# Patient Record
Sex: FEMALE | Race: WHITE | Employment: UNEMPLOYED | URBAN - METROPOLITAN AREA
[De-identification: names, ages, dates, MRNs, and addresses within clinical notes are randomized per-mention and may not be internally consistent; named-entity substitution may affect disease eponyms.]

---
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
## 2021-01-03 ENCOUNTER — APPOINTMENT (OUTPATIENT)
Dept: CT IMAGING | Age: 21
End: 2021-01-03
Payer: COMMERCIAL

## 2021-01-03 ENCOUNTER — HOSPITAL ENCOUNTER (EMERGENCY)
Age: 21
Discharge: HOME | End: 2021-01-03
Attending: STUDENT IN AN ORGANIZED HEALTH CARE EDUCATION/TRAINING PROGRAM
Payer: COMMERCIAL

## 2021-01-03 VITALS
RESPIRATION RATE: 14 BRPM | HEIGHT: 62 IN | SYSTOLIC BLOOD PRESSURE: 102 MMHG | DIASTOLIC BLOOD PRESSURE: 72 MMHG | TEMPERATURE: 98.4 F | BODY MASS INDEX: 20.89 KG/M2 | HEART RATE: 88 BPM | OXYGEN SATURATION: 100 %

## 2021-01-03 DIAGNOSIS — N83.201 RIGHT OVARIAN CYST: Primary | ICD-10-CM

## 2021-01-03 DIAGNOSIS — N93.8 DYSFUNCTIONAL UTERINE BLEEDING: ICD-10-CM

## 2021-01-03 LAB
ALBUMIN SERPL-MCNC: 4.5 G/DL (ref 3.4–5)
ALP BLD-CCNC: 71 U/L (ref 40–129)
ALT SERPL-CCNC: 12 U/L (ref 10–40)
ANION GAP SERPL CALCULATED.3IONS-SCNC: 10 MMOL/L (ref 3–16)
AST SERPL-CCNC: 19 U/L (ref 15–37)
BACTERIA WET PREP: NORMAL
BACTERIA: ABNORMAL /HPF
BASOPHILS ABSOLUTE: 0 K/UL (ref 0–0.2)
BASOPHILS RELATIVE PERCENT: 0.6 %
BILIRUB SERPL-MCNC: <0.2 MG/DL (ref 0–1)
BILIRUBIN DIRECT: <0.2 MG/DL (ref 0–0.3)
BILIRUBIN URINE: NEGATIVE
BILIRUBIN, INDIRECT: NORMAL MG/DL (ref 0–1)
BLOOD, URINE: ABNORMAL
BUN BLDV-MCNC: 12 MG/DL (ref 7–20)
CALCIUM SERPL-MCNC: 9.2 MG/DL (ref 8.3–10.6)
CHLORIDE BLD-SCNC: 104 MMOL/L (ref 99–110)
CLARITY: ABNORMAL
CLUE CELLS: NORMAL
CO2: 26 MMOL/L (ref 21–32)
COLOR: YELLOW
CREAT SERPL-MCNC: 0.6 MG/DL (ref 0.6–1.1)
EOSINOPHILS ABSOLUTE: 0.1 K/UL (ref 0–0.6)
EOSINOPHILS RELATIVE PERCENT: 1.6 %
EPITHELIAL CELLS WET PREP: NORMAL
EPITHELIAL CELLS, UA: ABNORMAL /HPF (ref 0–5)
GFR AFRICAN AMERICAN: >60
GFR NON-AFRICAN AMERICAN: >60
GLUCOSE BLD-MCNC: 86 MG/DL (ref 70–99)
GLUCOSE URINE: NEGATIVE MG/DL
HCG QUALITATIVE: NEGATIVE
HCT VFR BLD CALC: 35.9 % (ref 36–48)
HEMOGLOBIN: 11 G/DL (ref 12–16)
KETONES, URINE: ABNORMAL MG/DL
LEUKOCYTE ESTERASE, URINE: ABNORMAL
LIPASE: 34 U/L (ref 13–60)
LYMPHOCYTES ABSOLUTE: 2.4 K/UL (ref 1–5.1)
LYMPHOCYTES RELATIVE PERCENT: 35.9 %
MCH RBC QN AUTO: 24.2 PG (ref 26–34)
MCHC RBC AUTO-ENTMCNC: 30.7 G/DL (ref 31–36)
MCV RBC AUTO: 78.7 FL (ref 80–100)
MICROSCOPIC EXAMINATION: YES
MONOCYTES ABSOLUTE: 0.7 K/UL (ref 0–1.3)
MONOCYTES RELATIVE PERCENT: 10.5 %
MUCUS: ABNORMAL /LPF
NEUTROPHILS ABSOLUTE: 3.4 K/UL (ref 1.7–7.7)
NEUTROPHILS RELATIVE PERCENT: 51.4 %
NITRITE, URINE: NEGATIVE
PDW BLD-RTO: 15.9 % (ref 12.4–15.4)
PH UA: 7 (ref 5–8)
PLATELET # BLD: 308 K/UL (ref 135–450)
PMV BLD AUTO: 7.2 FL (ref 5–10.5)
POTASSIUM REFLEX MAGNESIUM: 4.1 MMOL/L (ref 3.5–5.1)
PROTEIN UA: NEGATIVE MG/DL
RBC # BLD: 4.56 M/UL (ref 4–5.2)
RBC UA: ABNORMAL /HPF (ref 0–4)
RBC WET PREP: NORMAL
SODIUM BLD-SCNC: 140 MMOL/L (ref 136–145)
SOURCE WET PREP: NORMAL
SPECIFIC GRAVITY UA: 1.02 (ref 1–1.03)
TOTAL PROTEIN: 7.4 G/DL (ref 6.4–8.2)
TRICHOMONAS PREP: NORMAL
URINE REFLEX TO CULTURE: YES
URINE TYPE: ABNORMAL
UROBILINOGEN, URINE: 0.2 E.U./DL
WBC # BLD: 6.6 K/UL (ref 4–11)
WBC UA: ABNORMAL /HPF (ref 0–5)
WBC WET PREP: NORMAL
YEAST WET PREP: NORMAL

## 2021-01-03 PROCEDURE — 85025 COMPLETE CBC W/AUTO DIFF WBC: CPT

## 2021-01-03 PROCEDURE — 87086 URINE CULTURE/COLONY COUNT: CPT

## 2021-01-03 PROCEDURE — 99284 EMERGENCY DEPT VISIT MOD MDM: CPT

## 2021-01-03 PROCEDURE — 80048 BASIC METABOLIC PNL TOTAL CA: CPT

## 2021-01-03 PROCEDURE — 81001 URINALYSIS AUTO W/SCOPE: CPT

## 2021-01-03 PROCEDURE — 87591 N.GONORRHOEAE DNA AMP PROB: CPT

## 2021-01-03 PROCEDURE — 87491 CHLMYD TRACH DNA AMP PROBE: CPT

## 2021-01-03 PROCEDURE — 36415 COLL VENOUS BLD VENIPUNCTURE: CPT

## 2021-01-03 PROCEDURE — 74177 CT ABD & PELVIS W/CONTRAST: CPT

## 2021-01-03 PROCEDURE — 6360000004 HC RX CONTRAST MEDICATION: Performed by: STUDENT IN AN ORGANIZED HEALTH CARE EDUCATION/TRAINING PROGRAM

## 2021-01-03 PROCEDURE — 80076 HEPATIC FUNCTION PANEL: CPT

## 2021-01-03 PROCEDURE — 83690 ASSAY OF LIPASE: CPT

## 2021-01-03 PROCEDURE — 84703 CHORIONIC GONADOTROPIN ASSAY: CPT

## 2021-01-03 PROCEDURE — 87210 SMEAR WET MOUNT SALINE/INK: CPT

## 2021-01-03 RX ORDER — IBUPROFEN 600 MG/1
600 TABLET ORAL 4 TIMES DAILY PRN
Qty: 40 TABLET | Refills: 0 | Status: SHIPPED | OUTPATIENT
Start: 2021-01-03

## 2021-01-03 RX ADMIN — IOPAMIDOL 100 ML: 755 INJECTION, SOLUTION INTRAVENOUS at 16:40

## 2021-01-03 ASSESSMENT — PAIN DESCRIPTION - LOCATION: LOCATION: ABDOMEN

## 2021-01-03 ASSESSMENT — PAIN SCALES - GENERAL: PAINLEVEL_OUTOF10: 5

## 2021-01-03 ASSESSMENT — PAIN DESCRIPTION - FREQUENCY: FREQUENCY: INTERMITTENT

## 2021-01-03 ASSESSMENT — PAIN - FUNCTIONAL ASSESSMENT: PAIN_FUNCTIONAL_ASSESSMENT: ACTIVITIES ARE NOT PREVENTED

## 2021-01-03 ASSESSMENT — PAIN DESCRIPTION - ONSET: ONSET: ON-GOING

## 2021-01-03 ASSESSMENT — PAIN DESCRIPTION - PAIN TYPE: TYPE: ACUTE PAIN

## 2021-01-03 NOTE — ED NOTES
Patient verbalized understanding of discharge instructions and follow-up care. Patient was given 1 prescription and verbalized understanding of instructions for said prescription. Breathing regular, no distress, skin color, texture, turgor normal. No rashes or lesions, patient alert and oriented X3. Patient ambulated out of emergency department with steady gait to private vehicle.        Rona Conley RN  01/03/21 7981

## 2021-01-03 NOTE — ED PROVIDER NOTES
[address] ENCOUNTER      Pt Name: Justice Mock  MRN: [id number]  [place] 2000  Date of evaluation: 1/3/2021  Provider: Alisha Syed MD    [address] [place] [address]       Chief Complaint   Patient presents with    Abdominal Pain     x 3 weeks, no worse, no better, cramping and aching    Vaginal Bleeding     x 3 weeks, 4 months post partum         HISTORY OF PRESENT ILLNESS   (Location/Symptom, Timing/Onset,Context/Setting, Quality, Duration, Modifying Factors, Severity)  Note limiting factors. Justice Mock is a 24 y.o. female who presents to the emergency department who c/o cramping right sided abdominal pain for the past 3 weeks. Onset gradual, persistent, not worsening, associated with intermittent vaginal bleeding she describes as initially like her typical period 3 weeks ago and then intermittent spotting since. She is 4 months post-partum from an uncomplicated  where she had low grade vaginal lacerations repaired. She denies vaginal pain, states scant white vaginal discharge that she states is always present and unchanged. Pain predominantly located on the right side, upper quadrant more than lower quadrant, described as mild to moderate cramping pain but typically persistent throughout the day, not severe. Denies fevers, chills, nausea, vomiting, anorexia, diarrhea, constipation, dyspareunia, dysuria, frequency, hematuria. She states she broke up with her male sexual partner a few weeks ago. They did not use any barrier contraception. Symptoms not otherwise alleviated or exacerbated by other factors. NursingNotes were reviewed. REVIEW OF SYSTEMS    (2-9 systems for level 4, 10 or more for level 5)       Constitutional: No fever or chills. Eye: No visual disturbances. No eye pain. Ear/Nose/Mouth/Throat: No nasal congestion. No sore throat. Respiratory: No cough, No shortness of breath, No sputum production.   Cardiovascular: No chest pain. No palpitations. Gastrointestinal: + abdominal pain. No nausea or vomiting  Genitourinary: No dysuria. No hematuria. Hematology/Lymphatics: No bleeding or bruising tendency. Immunologic: No malaise. No swollen glands. Musculoskeletal: No back pain. No joint pain. Integumentary: No rash. No abrasions. Neurologic: No headache. No focal numbness or weakness. PAST MEDICAL HISTORY   History reviewed. No pertinent past medical history. SURGICALHISTORY     History reviewed. No pertinent surgical history. CURRENT MEDICATIONS       Previous Medications    ACETAMINOPHEN (TYLENOL) 325 MG TABLET    Take 650 mg by mouth every 6 hours as needed for Pain    IBUPROFEN (IBU) 600 MG TABLET    Take 1 tablet by mouth every 6 hours as needed for Pain    PRENATAL VIT-FE FUMARATE-FA (PRENATAL 19 PO)    Take by mouth       ALLERGIES     Patient has no known allergies. FAMILY HISTORY     History reviewed. No pertinent family history.        SOCIAL HISTORY       Social History     Socioeconomic History    Marital status: Single     Spouse name: None    Number of children: None    Years of education: None    Highest education level: None   Occupational History    None   Social Needs    Financial resource strain: None    Food insecurity     Worry: None     Inability: None    Transportation needs     Medical: None     Non-medical: None   Tobacco Use    Smoking status: Never Smoker    Smokeless tobacco: Never Used   Substance and Sexual Activity    Alcohol use: No    Drug use: No    Sexual activity: Yes     Partners: Male   Lifestyle    Physical activity     Days per week: None     Minutes per session: None    Stress: None   Relationships    Social connections     Talks on phone: None     Gets together: None     Attends Jain service: None     Active member of club or organization: None     Attends meetings of clubs or organizations: None     Relationship status: None    Intimate partner violence     Fear of current or ex partner: None     Emotionally abused: None     Physically abused: None     Forced sexual activity: None   Other Topics Concern    None   Social History Narrative    None       SCREENINGS             PHYSICAL EXAM    (up to 7 for level 4, 8 or more for level 5)     ED Triage Vitals [01/03/21 1548]   BP Temp Temp Source Heart Rate Resp SpO2 Height Weight   120/67 98.4 °F (36.9 °C) Oral 96 14 100 % -- --       General: Alert and oriented appropriately for age, No acute distress. Eye: Normal conjunctiva. Pupils equal and reactive. HENT: Oral mucosa is moist.  Respiratory: Respirations even and non-labored. Cardiovascular: Normal rate, Regular rhythm. Gastrointestinal: Soft, Ttp in the RUQ and RLQ, Non-distended. :  RN ravi Estrella present during exam.  Normal external female genitalia, no trauma or lacerations. Scant blood in the vaginal vault, small amount of what appears to be white physiologic discharge mixed with the blood. Os closed. No CMT, no left adnexal tenderness, small amount R sided adnexal ttp. No masses, no fullness. Uterus nttp. Musculoskeletal: No swelling. Integumentary: Warm, Dry. Neurologic: Alert and appropriate for age. No focal deficits. Psychiatric: Cooperative. DIAGNOSTIC RESULTS       RADIOLOGY:   Non-plain filmimages such as CT, Ultrasound and MRI are read by the radiologist. Plain radiographic images are visualized and preliminarily interpreted by the emergency physician with the below findings:      Interpretation per the Radiologist below, if available at the time ofthis note:    CT ABDOMEN PELVIS W IV CONTRAST Additional Contrast? None   Final Result   2.6 cm right ovarian cyst.  Otherwise negative examination, no other findings   to explain abdomen pain.                LABS:  Labs Reviewed   CBC WITH AUTO DIFFERENTIAL - Abnormal; Notable for the following components:       Result Value    Hemoglobin 11.0 (*)     Hematocrit 35.9 (*)     MCV 78.7 (*)     MCH 24.2 (*)     MCHC 30.7 (*)     RDW 15.9 (*)     All other components within normal limits    Narrative:     Performed at:  [facility]  [address]   Phone [phone]   URINE RT REFLEX TO CULTURE - Abnormal; Notable for the following components:    Ketones, Urine TRACE (*)     Blood, Urine LARGE (*)     Leukocyte Esterase, Urine SMALL (*)     All other components within normal limits    Narrative:     Performed at:  [facility]  [address]   Phone [phone]   MICROSCOPIC URINALYSIS - Abnormal; Notable for the following components:    Mucus, UA Rare (*)     WBC, UA 10-20 (*)     RBC, UA 11-20 (*)     Epithelial Cells, UA 11-20 (*)     Bacteria, UA 2+ (*)     All other components within normal limits    Narrative:     Performed at:  [facility]  [address]   Phone [phone] DNA, URINE   WET PREP, GENITAL   CULTURE, URINE   BASIC METABOLIC PANEL W/ REFLEX TO MG FOR LOW K    Narrative:     Performed at:  [facility]  [address]   Phone [phone]   HEPATIC FUNCTION PANEL    Narrative:     Performed at:  [facility]  [address]   Phone [phone]   LIPASE    Narrative:     Performed at:  [facility]  [address]   Phone [phone]   HCG, SERUM, QUALITATIVE    Narrative:     Performed at:  [facility]  [address]   Phone [phone]       All other labs were within normal range or not returned as of this dictation.     EMERGENCY DEPARTMENT COURSE and DIFFERENTIAL DIAGNOSIS/MDM:   Vitals:    Vitals:    21 1548   BP: 120/67   Pulse: 96   Resp: 14   Temp: 98.4 °F (36.9 °C)   TempSrc: Oral   SpO2: 100%         Medical decision makin yo F with R-sided cramping abdominal pain and tenderness coupled with vaginal bleeding for the past 3 weeks. Pt also with vaginal discharge but does appear to be physiologic and unchanged from prior, mild amt R adnexal ttp on examination as well. Pain/tenderness predominantly RUQ on exam, pt young for gallstones but given recent pregnancy will examine with CT to r/o in addition to ruling out appendicitis though less likely given chronicity of illness, no leukocytosis. History of persistent mild-moderate cramping pain x 2-3 weeks coupled with bleeding not c/w torsion, getting hCG to r/o ectopic. Found to have R ovarian cyst on exam, likely etiology of pt's pain and possibly contributing to DUB, will likely need further w/u as outpatient. Appears to be simple cyst on CT, does not appear to be TOA. Pt again afebrile, HDS, without vomiting and with minimal adnexal ttp. Pt wishes to be tested for gonorrhea/chlamydia and offered empiric treatment but prefers to wait for results prior to treatment. Had shared decision making discussion with Ms. Keila Osorio regarding obtainment of ultrasound in the emergency department at [facility] to definitively r/o TOA, discussing my clinical impression that her pain is most likely from the ovarian cyst but cannot completely rule out TOA without further testing and workup despite the lower likelihood of that diagnosis. She voiced understanding of this, the present albeit low chance of potential implications of missed diagnosis to her health and fertility and prefers to follow up as an outpatient with her gynecologist for further workup and outpatient ultrasound to better characterize the R ovarian cyst.  She tolerates PO in the ED, remains HDS, Hb 11. Has no urinary sxs to suggest UTI. Given d/c instructions and strict return precautions educating her on signs and symptoms that should prompt return to the ED in lay terminology, she voices understanding. Rx ibuprofen for pain. D/c home, ambulated steadily from the ED. FINAL IMPRESSION      1. Right ovarian cyst    2. Dysfunctional uterine bleeding          DISPOSITION/PLAN   DISPOSITION  Discharge home with gyn follow up. PATIENT REFERRED TO:  Lilian Goldberg MD  [address] [place] [address] Drive  [address] [place] [address]  [phone]    In 2 days      [address] [place] [address] [phone]    If symptoms worsen    [facility] [address]  [address]  [phone]  In 2 days  as you are established with them. You can also follow up with Dr. Sandee Orellana if you are unable to be seen by [facility].       DISCHARGE MEDICATIONS:  Discharge Medication List as of 1/3/2021  5:45 PM      START taking these medications    Details   ibuprofen (ADVIL;MOTRIN) 600 MG tablet Take 1 tablet by mouth 4 times daily as needed for Pain, Disp-40 tablet, R-0Print                (Please note that portions of this note were completed with a voice recognition program.Efforts were made to edit the dictations but occasionally words are mis-transcribed.)    Eren Erickson MD (electronically signed)  Attending Emergency Physician          Eren Erickson MD  01/05/21 4419

## 2021-01-05 LAB — URINE CULTURE, ROUTINE: NORMAL

## 2021-01-06 LAB
C TRACH DNA GENITAL QL NAA+PROBE: NEGATIVE
N. GONORRHOEAE DNA: NEGATIVE

## 2021-08-07 ENCOUNTER — HOSPITAL ENCOUNTER (EMERGENCY)
Age: 21
Discharge: HOME | End: 2021-08-08
Attending: EMERGENCY MEDICINE
Payer: COMMERCIAL

## 2021-08-07 VITALS
DIASTOLIC BLOOD PRESSURE: 64 MMHG | OXYGEN SATURATION: 98 % | RESPIRATION RATE: 16 BRPM | HEART RATE: 81 BPM | SYSTOLIC BLOOD PRESSURE: 100 MMHG | TEMPERATURE: 97.7 F

## 2021-08-07 DIAGNOSIS — R10.30 LOWER ABDOMINAL PAIN: Primary | ICD-10-CM

## 2021-08-07 LAB
BILIRUBIN URINE: NEGATIVE
BLOOD, URINE: NEGATIVE
CLARITY: CLEAR
COLOR: YELLOW
GLUCOSE URINE: NEGATIVE MG/DL
HCG(URINE) PREGNANCY TEST: NEGATIVE
KETONES, URINE: NEGATIVE MG/DL
LEUKOCYTE ESTERASE, URINE: NEGATIVE
MICROSCOPIC EXAMINATION: NORMAL
NITRITE, URINE: NEGATIVE
PH UA: 6 (ref 5–8)
PROTEIN UA: NEGATIVE MG/DL
SPECIFIC GRAVITY UA: >=1.03 (ref 1–1.03)
URINE REFLEX TO CULTURE: NORMAL
URINE TYPE: NORMAL
UROBILINOGEN, URINE: 0.2 E.U./DL

## 2021-08-07 PROCEDURE — 99282 EMERGENCY DEPT VISIT SF MDM: CPT

## 2021-08-07 PROCEDURE — 6370000000 HC RX 637 (ALT 250 FOR IP): Performed by: EMERGENCY MEDICINE

## 2021-08-07 PROCEDURE — 6360000002 HC RX W HCPCS: Performed by: EMERGENCY MEDICINE

## 2021-08-07 PROCEDURE — 81003 URINALYSIS AUTO W/O SCOPE: CPT

## 2021-08-07 PROCEDURE — 96372 THER/PROPH/DIAG INJ SC/IM: CPT

## 2021-08-07 PROCEDURE — 84703 CHORIONIC GONADOTROPIN ASSAY: CPT

## 2021-08-07 RX ORDER — ONDANSETRON 4 MG/1
4 TABLET, ORALLY DISINTEGRATING ORAL ONCE
Status: COMPLETED | OUTPATIENT
Start: 2021-08-07 | End: 2021-08-07

## 2021-08-07 RX ORDER — KETOROLAC TROMETHAMINE 30 MG/ML
15 INJECTION, SOLUTION INTRAMUSCULAR; INTRAVENOUS ONCE
Status: COMPLETED | OUTPATIENT
Start: 2021-08-07 | End: 2021-08-07

## 2021-08-07 RX ORDER — ACETAMINOPHEN AND CODEINE PHOSPHATE 120; 12 MG/5ML; MG/5ML
SOLUTION ORAL
COMMUNITY
Start: 2021-06-15

## 2021-08-07 RX ADMIN — ONDANSETRON 4 MG: 4 TABLET, ORALLY DISINTEGRATING ORAL at 23:44

## 2021-08-07 RX ADMIN — KETOROLAC TROMETHAMINE 15 MG: 30 INJECTION, SOLUTION INTRAMUSCULAR; INTRAVENOUS at 23:44

## 2021-08-07 ASSESSMENT — PAIN DESCRIPTION - LOCATION: LOCATION: ABDOMEN

## 2021-08-07 ASSESSMENT — PAIN SCALES - GENERAL
PAINLEVEL_OUTOF10: 6
PAINLEVEL_OUTOF10: 5

## 2021-08-08 RX ORDER — DICYCLOMINE HYDROCHLORIDE 10 MG/1
10 CAPSULE ORAL EVERY 6 HOURS PRN
Qty: 20 CAPSULE | Refills: 0 | Status: SHIPPED | OUTPATIENT
Start: 2021-08-08

## 2021-08-08 RX ORDER — ONDANSETRON 4 MG/1
4 TABLET, ORALLY DISINTEGRATING ORAL EVERY 8 HOURS PRN
Qty: 20 TABLET | Refills: 0 | Status: SHIPPED | OUTPATIENT
Start: 2021-08-08 | End: 2021-11-04 | Stop reason: ALTCHOICE

## 2021-08-08 RX ORDER — NAPROXEN 500 MG/1
500 TABLET ORAL 2 TIMES DAILY WITH MEALS
Qty: 60 TABLET | Refills: 5 | Status: SHIPPED | OUTPATIENT
Start: 2021-08-08

## 2021-08-08 ASSESSMENT — PAIN - FUNCTIONAL ASSESSMENT: PAIN_FUNCTIONAL_ASSESSMENT: 0-10

## 2021-08-08 ASSESSMENT — ENCOUNTER SYMPTOMS
ABDOMINAL PAIN: 1
VOMITING: 0
BACK PAIN: 0
NAUSEA: 0
ABDOMINAL DISTENTION: 0

## 2021-08-08 ASSESSMENT — PAIN SCALES - GENERAL: PAINLEVEL_OUTOF10: 2

## 2021-08-08 NOTE — ED PROVIDER NOTES
[facility]  EMERGENCY DEPARTMENTENCOUNTER      Pt Name: Brittney Hurley  MRN: [id number]  [place] 2000  Date ofevaluation: 8/7/2021  Provider: Roderick Trinidad MD    CHIEF COMPLAINT       Chief Complaint   Patient presents with    Abdominal Pain         HISTORY OF PRESENT ILLNESS   (Location/Symptom, Timing/Onset,Context/Setting, Quality, Duration, Modifying Factors, Severity)  Note limiting factors. Brittney Hurley is a 22 y.o. female  who  has no past medical history on file. who presents to the emergency department for evaluation of lower abdominal pain. Patient reports gradual onset of lower abdominal pain which is persistent, with no remitting or exacerbating factors. Denies associated nausea or vomiting. Denies changes in bowel or urine function. She states she does have a history of ovarian cysts symptoms. Feels similar to previous. She does not take medications for symptoms. She does not follow with OB. She denies fevers. States that the abdominal pain is in the lower abdominal region. HPI    NursingNotes were reviewed. REVIEW OF SYSTEMS    (2-9 systems for level 4, 10 or more for level 5)     Review of Systems   Constitutional: Negative for chills and fever. Gastrointestinal: Positive for abdominal pain. Negative for abdominal distention, nausea and vomiting. Genitourinary: Negative for decreased urine volume, dysuria, flank pain, frequency, hematuria, menstrual problem, pelvic pain, vaginal bleeding, vaginal discharge and vaginal pain. Musculoskeletal: Negative for back pain. All other systems reviewed and are negative. Except as noted above the remainder of the review of systems was reviewed and negative. PAST MEDICAL HISTORY   No past medical history on file. SURGICALHISTORY     No past surgical history on file.       CURRENT MEDICATIONS       Previous Medications    ACETAMINOPHEN (TYLENOL) 325 MG TABLET    Take 650 mg by mouth (36.5 °C) Oral 81 16 98 % -- --       Physical Exam  Vitals and nursing note reviewed. Constitutional:       Appearance: She is well-developed. HENT:      Head: Normocephalic and atraumatic. Eyes:      Conjunctiva/sclera: Conjunctivae normal.      Pupils: Pupils are equal, round, and reactive to light. Neck:      Trachea: No tracheal deviation. Cardiovascular:      Rate and Rhythm: Normal rate and regular rhythm. Heart sounds: Normal heart sounds. Pulmonary:      Effort: Pulmonary effort is normal.      Breath sounds: Normal breath sounds. Abdominal:      General: There is no distension. Palpations: Abdomen is soft. Tenderness: There is no abdominal tenderness. There is no guarding or rebound. Negative signs include Rovsing's sign, McBurney's sign and psoas sign. Musculoskeletal:         General: Normal range of motion. Cervical back: Normal range of motion. Skin:     General: Skin is warm and dry. Neurological:      Mental Status: She is alert and oriented to person, place, and time.          RESULTS     EKG: All EKG's are interpreted by the Emergency Department Physician who either signs or Co-signsthis chart in the absence of a cardiologist.    RADIOLOGY:   Non-plain filmimages such as CT, Ultrasound and MRI are read by the radiologist. Plain radiographic images are visualized and preliminarily interpreted by the emergency physician with the below findings:      Interpretation per the Radiologist below, if available at the time ofthis note:    No orders to display         ED BEDSIDE ULTRASOUND:   Performed by ED Physician - none    LABS:  Labs Reviewed   URINE RT REFLEX TO CULTURE    Narrative:     Performed at:  [facility]) - [facility]  [address] [facility]   Phone [phone]   PREGNANCY, URINE    Narrative:     Performed at:  [facility]  [address] [facility] Phone [phone]       All other labs were within normal range or not returned as of this dictation. EMERGENCY DEPARTMENT COURSE and DIFFERENTIAL DIAGNOSIS/MDM:   Vitals:    Vitals:    08/07/21 2309   BP: 100/64   Pulse: 81   Resp: 16   Temp: 97.7 °F (36.5 °C)   TempSrc: Oral   SpO2: 98%       Patient was given thefollowing medications:  Medications   ondansetron (ZOFRAN-ODT) disintegrating tablet 4 mg (4 mg Oral Given 8/7/21 2344)   ketorolac (TORADOL) injection 15 mg (15 mg Intramuscular Given 8/7/21 2344)       ED COURSE & MEDICAL DECISION MAKING    Pertinent Labs & Imaging studies reviewed. (See chart for details)   -  Patient seen and evaluated in the emergency department. -  Triage and nursing notes reviewed and incorporated. -  Old chart records reviewed and incorporated. -  Differential diagnosis includes: Differential diagnosis: Abdominal Aortic Aneurysm, Ischemic Bowel, Bowel Obstruction, Appendicitis, Diverticulitis, Pyelonephritis, UTI, STD, Ureterolithiasis, Colitis, Gonad Torsion, other    -  Work-up included:  See above  -  ED treatment included: See above  -  Results discussed with patient. Patient resents the ED for evaluation of lower abdominal pain. She appears to be in no acute distress vital signs are within normal limits. She has not taken medications for pains. States that it feels similar to pain associated with ovarian cyst.  Abdominal exam is benign. No CVA tenderness. Urinalysis unremarkable urine pregnancy is negative. Patient treated symptomatically on reevaluation states that her pain is significantly improved. Discussed symptomatic treatment and outpatient follow-up with the patient. The patient is agreeable with plan of care and disposition. REASSESSMENT          CRITICAL CARE TIME   Total Critical Care time was 0 minutes, excluding separately reportable procedures.   There was a high probability of clinically significant/life threatening deterioration in the patient's condition which required my urgent intervention. CONSULTS:  None    PROCEDURES:  Unless otherwise noted below, none     Procedures    FINAL IMPRESSION      1. Lower abdominal pain          DISPOSITION/PLAN   DISPOSITION Decision To Discharge 08/08/2021 12:07:08 AM      PATIENT REFERREDTO:  Irina Blas MD  [address]  [phone]    Schedule an appointment as soon as possible for a visit   As needed    [facility], [facility]  [address] [facility] [address]  [phone]  Schedule an appointment as soon as possible for a visit today  For follow up in 24-48 hours      DISCHARGEMEDICATIONS:  New Prescriptions    DICYCLOMINE (BENTYL) 10 MG CAPSULE    Take 1 capsule by mouth every 6 hours as needed (cramps)    NAPROXEN (NAPROSYN) 500 MG TABLET    Take 1 tablet by mouth 2 times daily (with meals)    ONDANSETRON (ZOFRAN ODT) 4 MG DISINTEGRATING TABLET    Take 1 tablet by mouth every 8 hours as needed for Nausea          (Please note that portions of this note were completed with a voice recognition program.  Efforts were made to edit the dictations but occasionally words are mis-transcribed.)    Juancho Griffith MD (electronically signed)  Attending Emergency Physician          Juancho Griffith MD  08/08/21 5004

## 2021-11-04 ENCOUNTER — APPOINTMENT (OUTPATIENT)
Dept: CT IMAGING | Age: 21
End: 2021-11-04
Payer: COMMERCIAL

## 2021-11-04 ENCOUNTER — HOSPITAL ENCOUNTER (EMERGENCY)
Age: 21
Discharge: HOME | End: 2021-11-04
Attending: STUDENT IN AN ORGANIZED HEALTH CARE EDUCATION/TRAINING PROGRAM
Payer: COMMERCIAL

## 2021-11-04 VITALS
SYSTOLIC BLOOD PRESSURE: 119 MMHG | WEIGHT: 118.17 LBS | TEMPERATURE: 99.3 F | HEIGHT: 61 IN | OXYGEN SATURATION: 98 % | HEART RATE: 99 BPM | RESPIRATION RATE: 16 BRPM | BODY MASS INDEX: 22.31 KG/M2 | DIASTOLIC BLOOD PRESSURE: 61 MMHG

## 2021-11-04 DIAGNOSIS — N30.00 ACUTE CYSTITIS WITHOUT HEMATURIA: Primary | ICD-10-CM

## 2021-11-04 DIAGNOSIS — R51.9 ACUTE NONINTRACTABLE HEADACHE, UNSPECIFIED HEADACHE TYPE: ICD-10-CM

## 2021-11-04 LAB
A/G RATIO: 1.5 (ref 1.1–2.2)
ALBUMIN SERPL-MCNC: 4 G/DL (ref 3.4–5)
ALP BLD-CCNC: 59 U/L (ref 40–129)
ALT SERPL-CCNC: 17 U/L (ref 10–40)
ANION GAP SERPL CALCULATED.3IONS-SCNC: 11 MMOL/L (ref 3–16)
AST SERPL-CCNC: 26 U/L (ref 15–37)
BACTERIA: ABNORMAL /HPF
BASOPHILS ABSOLUTE: 0 K/UL (ref 0–0.2)
BASOPHILS RELATIVE PERCENT: 0.2 %
BILIRUB SERPL-MCNC: <0.2 MG/DL (ref 0–1)
BILIRUBIN URINE: NEGATIVE
BLOOD, URINE: NEGATIVE
BUN BLDV-MCNC: 14 MG/DL (ref 7–20)
CALCIUM SERPL-MCNC: 8.1 MG/DL (ref 8.3–10.6)
CHLORIDE BLD-SCNC: 105 MMOL/L (ref 99–110)
CLARITY: ABNORMAL
CO2: 23 MMOL/L (ref 21–32)
COLOR: YELLOW
CREAT SERPL-MCNC: 0.5 MG/DL (ref 0.6–1.1)
EOSINOPHILS ABSOLUTE: 0 K/UL (ref 0–0.6)
EOSINOPHILS RELATIVE PERCENT: 0.5 %
EPITHELIAL CELLS, UA: 21 /HPF (ref 0–5)
GFR AFRICAN AMERICAN: >60
GFR NON-AFRICAN AMERICAN: >60
GLUCOSE BLD-MCNC: 87 MG/DL (ref 70–99)
GLUCOSE URINE: NEGATIVE MG/DL
HCG(URINE) PREGNANCY TEST: NEGATIVE
HCT VFR BLD CALC: 40.1 % (ref 36–48)
HEMOGLOBIN: 13.2 G/DL (ref 12–16)
HYALINE CASTS: 6 /LPF (ref 0–8)
KETONES, URINE: ABNORMAL MG/DL
LEUKOCYTE ESTERASE, URINE: ABNORMAL
LIPASE: 35 U/L (ref 13–60)
LYMPHOCYTES ABSOLUTE: 0.6 K/UL (ref 1–5.1)
LYMPHOCYTES RELATIVE PERCENT: 11.6 %
MCH RBC QN AUTO: 27.8 PG (ref 26–34)
MCHC RBC AUTO-ENTMCNC: 32.9 G/DL (ref 31–36)
MCV RBC AUTO: 84.4 FL (ref 80–100)
MICROSCOPIC EXAMINATION: YES
MONOCYTES ABSOLUTE: 0.4 K/UL (ref 0–1.3)
MONOCYTES RELATIVE PERCENT: 6.6 %
NEUTROPHILS ABSOLUTE: 4.4 K/UL (ref 1.7–7.7)
NEUTROPHILS RELATIVE PERCENT: 81.1 %
NITRITE, URINE: NEGATIVE
PDW BLD-RTO: 14.3 % (ref 12.4–15.4)
PH UA: 6.5 (ref 5–8)
PLATELET # BLD: 200 K/UL (ref 135–450)
PMV BLD AUTO: 7.5 FL (ref 5–10.5)
POTASSIUM REFLEX MAGNESIUM: 4.1 MMOL/L (ref 3.5–5.1)
PROLACTIN: 11.9 NG/ML
PROTEIN UA: 30 MG/DL
RBC # BLD: 4.75 M/UL (ref 4–5.2)
RBC UA: 2 /HPF (ref 0–4)
SODIUM BLD-SCNC: 139 MMOL/L (ref 136–145)
SPECIFIC GRAVITY UA: >1.03 (ref 1–1.03)
TOTAL PROTEIN: 6.6 G/DL (ref 6.4–8.2)
URINE REFLEX TO CULTURE: YES
URINE TYPE: ABNORMAL
UROBILINOGEN, URINE: 1 E.U./DL
WBC # BLD: 5.4 K/UL (ref 4–11)
WBC UA: 11 /HPF (ref 0–5)

## 2021-11-04 PROCEDURE — 83690 ASSAY OF LIPASE: CPT

## 2021-11-04 PROCEDURE — 96374 THER/PROPH/DIAG INJ IV PUSH: CPT

## 2021-11-04 PROCEDURE — 80053 COMPREHEN METABOLIC PANEL: CPT

## 2021-11-04 PROCEDURE — 84146 ASSAY OF PROLACTIN: CPT

## 2021-11-04 PROCEDURE — 81001 URINALYSIS AUTO W/SCOPE: CPT

## 2021-11-04 PROCEDURE — 87086 URINE CULTURE/COLONY COUNT: CPT

## 2021-11-04 PROCEDURE — 6360000002 HC RX W HCPCS: Performed by: STUDENT IN AN ORGANIZED HEALTH CARE EDUCATION/TRAINING PROGRAM

## 2021-11-04 PROCEDURE — 85025 COMPLETE CBC W/AUTO DIFF WBC: CPT

## 2021-11-04 PROCEDURE — 70450 CT HEAD/BRAIN W/O DYE: CPT

## 2021-11-04 PROCEDURE — 84703 CHORIONIC GONADOTROPIN ASSAY: CPT

## 2021-11-04 PROCEDURE — 99284 EMERGENCY DEPT VISIT MOD MDM: CPT

## 2021-11-04 PROCEDURE — 6370000000 HC RX 637 (ALT 250 FOR IP): Performed by: STUDENT IN AN ORGANIZED HEALTH CARE EDUCATION/TRAINING PROGRAM

## 2021-11-04 PROCEDURE — 2580000003 HC RX 258: Performed by: STUDENT IN AN ORGANIZED HEALTH CARE EDUCATION/TRAINING PROGRAM

## 2021-11-04 RX ORDER — ONDANSETRON 4 MG/1
4 TABLET, ORALLY DISINTEGRATING ORAL EVERY 8 HOURS PRN
Qty: 20 TABLET | Refills: 0 | Status: SHIPPED | OUTPATIENT
Start: 2021-11-04

## 2021-11-04 RX ORDER — SODIUM CHLORIDE, SODIUM LACTATE, POTASSIUM CHLORIDE, AND CALCIUM CHLORIDE .6; .31; .03; .02 G/100ML; G/100ML; G/100ML; G/100ML
1000 INJECTION, SOLUTION INTRAVENOUS ONCE
Status: COMPLETED | OUTPATIENT
Start: 2021-11-04 | End: 2021-11-04

## 2021-11-04 RX ORDER — PROCHLORPERAZINE EDISYLATE 5 MG/ML
10 INJECTION INTRAMUSCULAR; INTRAVENOUS ONCE
Status: COMPLETED | OUTPATIENT
Start: 2021-11-04 | End: 2021-11-04

## 2021-11-04 RX ORDER — ACETAMINOPHEN 500 MG
1000 TABLET ORAL ONCE
Status: COMPLETED | OUTPATIENT
Start: 2021-11-04 | End: 2021-11-04

## 2021-11-04 RX ORDER — CEFUROXIME AXETIL 250 MG/1
250 TABLET ORAL 2 TIMES DAILY
Qty: 14 TABLET | Refills: 0 | Status: SHIPPED | OUTPATIENT
Start: 2021-11-04 | End: 2021-11-11

## 2021-11-04 RX ADMIN — ACETAMINOPHEN 1000 MG: 500 TABLET ORAL at 11:41

## 2021-11-04 RX ADMIN — SODIUM CHLORIDE, POTASSIUM CHLORIDE, SODIUM LACTATE AND CALCIUM CHLORIDE 1000 ML: 600; 310; 30; 20 INJECTION, SOLUTION INTRAVENOUS at 11:42

## 2021-11-04 RX ADMIN — PROCHLORPERAZINE EDISYLATE 10 MG: 5 INJECTION INTRAMUSCULAR; INTRAVENOUS at 11:41

## 2021-11-04 ASSESSMENT — PAIN DESCRIPTION - PAIN TYPE: TYPE: ACUTE PAIN

## 2021-11-04 ASSESSMENT — PAIN DESCRIPTION - LOCATION
LOCATION: ABDOMEN;HEAD
LOCATION: ABDOMEN

## 2021-11-04 ASSESSMENT — PAIN SCALES - GENERAL
PAINLEVEL_OUTOF10: 5
PAINLEVEL_OUTOF10: 8

## 2021-11-04 ASSESSMENT — PAIN DESCRIPTION - ORIENTATION: ORIENTATION: LOWER

## 2021-11-04 NOTE — ED PROVIDER NOTES
Triage note: I evaluated this patient to initiate their ED workup in an expeditious manner. Please see notes from other ED providers regarding comprehensive evaluation including full history, physical exam, interpretation of results, and medical decision making/disposition. HISTORY OF PRESENT ILLNESS  Marquise Chino is a 22 y.o. female with complaints of lower abdominal pain x 1 week. Denies urinary symptoms. With nausea, constipation. Threw up a week ago, but has not since. Denies vaginal bleeding or discharge. Is not concerned for STDs. Does have h/o ovarian cysts. Denies previous abdominal surgeries. Tried tylenol for pain relief, has not really helped. PHYSICAL EXAM  ED Triage Vitals [11/04/21 1008]   BP Temp Temp Source Pulse Resp SpO2 Height Weight   99/70 99.3 °F (37.4 °C) Temporal 94 16 97 % 5' 1\" (1.549 m) 118 lb 2.7 oz (53.6 kg)       On exam, with WDWN female, no acute distress. Diffuse lower abdominal tenderness. Labs and imaging ordered.         Rubin Huerta  11/04/21 1034

## 2021-11-04 NOTE — ED TRIAGE NOTES
Patient admitted to ED via private car with complaints of lower abdominal pain, headaches, and spontaneous lactation. Patient states that her abdominal pain and headache have been going on for the last week. States that she does not get headaches typically. Patient states that she is nauseous and food sounds \"gross. \" Denies vomiting and diarrhea. Patient started lactating yesterday and states that she stopped breastfeeding about 1 year ago. Pregnancy test was negative 2 days ago. Denies medical history. VS are WNL. Patient is alert and oriented x4.

## 2021-11-04 NOTE — ED NOTES
D/C: Order noted for d/c. Pt confirmed d/c paperwork and two prescriptions have correct name. Discharge and education instructions reviewed with patient. Teach-back successful. Pt verbalized understanding and signed d/c papers. Pt denied questions at this time. No acute distress noted. Patient instructed to follow-up as noted - return to emergency department if symptoms worsen. Patient verbalized understanding. Discharged per EDMD with discharge instructions. Pt discharged to private vehicle. Patient stable upon departure. Thanked patient for choosing [facility] for care. Provider aware of patient pain at time of discharge.        Burke Cohen, TL  11/04/21 0408

## 2021-11-04 NOTE — ED PROVIDER NOTES
[address] [facility]      Pt Name: Nieves Lott  MRN: [id number]  [place] 2000  Date of evaluation: 11/4/2021  Provider: Kumar Connolly MD    CHIEF COMPLAINT       Chief Complaint   Patient presents with    Abdominal Pain     low ab pain, has had weird things she states, he breasts are lactating, she is nausous, took home pregnancy test, it was negative     Abdominal pain. HISTORY OF PRESENT ILLNESS   (Location/Symptom, Timing/Onset,Context/Setting, Quality, Duration, Modifying Factors, Severity)  Note limiting factors. Nieves Lott is a 22 y.o. female who presents to the emergency department c/o abdominal pain for the past 2-3 days. Onset gradual, progressively worse, localized to the suprapubic area. Associated with headache, nausea, and spontaneous lactation. She states that she stopped breastfeeding 1 year ago and that she took a home pregnancy test and it was negative. She is sexually active, denies vaginal bleeding, vaginal discharge, dyspareunia, hematuria, dysuria but states slight urinary hesitancy. Denies diarrhea, fevers. Symptoms not otherwise alleviated or exacerbated by other factors. NursingNotes were reviewed. REVIEW OF SYSTEMS    (2-9 systems for level 4, 10 or more for level 5)       Constitutional: No fever or chills. Eye: No visual disturbances. No eye pain. Ear/Nose/Mouth/Throat: No nasal congestion. No sore throat. Respiratory: No cough, No shortness of breath, No sputum production. Cardiovascular: No chest pain. No palpitations. Gastrointestinal: + abdominal pain. + nausea, -ve diarrhea. Genitourinary: No dysuria. No hematuria. See HPI. Hematology/Lymphatics: No bleeding or bruising tendency. Immunologic: No malaise. No swollen glands. Musculoskeletal: No back pain. No joint pain. Integumentary: No rash. No abrasions. Neurologic: No headache. No focal numbness or weakness.       PAST MEDICAL HISTORY   History reviewed. No pertinent past medical history. SURGICALHISTORY     History reviewed. No pertinent surgical history. CURRENT MEDICATIONS       Discharge Medication List as of 11/4/2021  1:26 PM      CONTINUE these medications which have NOT CHANGED    Details   naproxen (NAPROSYN) 500 MG tablet Take 1 tablet by mouth 2 times daily (with meals), Disp-60 tablet, R-5Normal      dicyclomine (BENTYL) 10 MG capsule Take 1 capsule by mouth every 6 hours as needed (cramps), Disp-20 capsule, R-0Normal      norethindrone (MICRONOR) 0.35 MG tablet TAKE 1 TABLET BY MOUTH EVERY DAYHistorical Med      ibuprofen (ADVIL;MOTRIN) 600 MG tablet Take 1 tablet by mouth 4 times daily as needed for Pain, Disp-40 tablet, R-0Print      acetaminophen (TYLENOL) 325 MG tablet Take 650 mg by mouth every 6 hours as needed for PainHistorical Med             ALLERGIES     Latex    FAMILY HISTORY     History reviewed. No pertinent family history. SOCIAL HISTORY       Social History     Socioeconomic History    Marital status: Single     Spouse name: None    Number of children: None    Years of education: None    Highest education level: None   Occupational History    None   Tobacco Use    Smoking status: Never Smoker    Smokeless tobacco: Never Used   Vaping Use    Vaping Use: Never used   Substance and Sexual Activity    Alcohol use: No    Drug use: No    Sexual activity: Yes     Partners: Male   Other Topics Concern    None   Social History Narrative    None     Social Determinants of Health     Financial Resource Strain:     Difficulty of Paying Living Expenses:    Food Insecurity:     Worried About Running Out of Food in the Last Year:     Ran Out of Food in the Last Year:    Transportation Needs:     Lack of Transportation (Medical):      Lack of Transportation (Non-Medical):    Physical Activity:     Days of Exercise per Week:     Minutes of Exercise per Session:    Stress:     [facility] [address]   Phone [phone]   COMPREHENSIVE METABOLIC PANEL W/ REFLEX TO MG FOR LOW K - Abnormal; Notable for the following components:    CREATININE 0.5 (*)     Calcium 8.1 (*)     All other components within normal limits    Narrative:     Performed at:  [facility]  [address], [facility] [address] [address]   Phone [phone]   URINE RT REFLEX TO CULTURE - Abnormal; Notable for the following components:    Clarity, UA CLOUDY (*)     Ketones, Urine TRACE (*)     Protein, UA 30 (*)     Leukocyte Esterase, Urine TRACE (*)     All other components within normal limits    Narrative:     Performed at:  [facility]  [address] [facility] [address]   Phone [phone]   MICROSCOPIC URINALYSIS - Abnormal; Notable for the following components:    Bacteria, UA RARE (*)     WBC, UA 11 (*)     Epithelial Cells, UA 21 (*)     All other components within normal limits    Narrative:     Performed at:  [facility]  [address] [place], [facility] [place]   Phone [phone]   CULTURE, URINE    Narrative:     ORDER#: [id number]                          ORDERED BY: Suly Guerrero  SOURCE: Urine Clean Catch                  COLLECTED:  11/04/21 10:39  ANTIBIOTICS AT JARETT.:                      RECEIVED :  11/04/21 11:41  Performed at:  [facility]  [facility] [address], [facility] [address] [address]   Phone [phone]   LIPASE    Narrative:     Performed at:  [facility]  [address] [place] [address] [employer] [address]   Phone [phone]   PREGNANCY, URINE    Narrative:     Performed at:  [facility]  [address] [place] [address] [address]   Phone [phone]   PROLACTIN    Narrative:     Performed at:  [address]   Phone [phone]       All other labs were within normal range or not returned as of this dictation. EMERGENCY DEPARTMENT COURSE and DIFFERENTIAL DIAGNOSIS/MDM:   Vitals:    Vitals:    21 1200 21 1215 21 1230 21 1338   BP: 116/74 112/73 117/77 119/61   Pulse:    99   Resp:    16   Temp:       TempSrc:       SpO2: 97% 96% 100% 98%   Weight:       Height:             Medical decision makin yo F with suprapubic abd pain, headache, nausea and new onset lactation c/f pregnancy vs prolactinoma vs UTI vs less likely acute intraabdominal process. Pt states monogamous without dyspareunia since the rest of her sxs started, unlikely PID, with shared decision making, discussed and deferred pelvic exam, history not c/w torsion. Labs as ordered above, CTH to assess for intracranial mass lesion given the headache and lactation symptoms coupled together. CTH neg acute, pt feeling better after meds and IVFs. HCG negative here in the ED as well. UA contaminated but concerning for potential infection given the pt's symptoms. Repeat abd exam soft and nttp. No leukocytosis or other lab findings concerning, prolactin wnl. Discussed to return to the ED with any worsening for repeat eval.  Tolerating PO and ambulating in the ED steadily. D/c home. Medications   lactated ringers bolus (0 mLs IntraVENous Stopped 21 1256)   prochlorperazine (COMPAZINE) injection 10 mg (10 mg IntraVENous Given 21 1141)   acetaminophen (TYLENOL) tablet 1,000 mg (1,000 mg Oral Given 21 1141)     I estimate there is LOW risk for (including but not limited to) ACUTE APPENDICITIS, BOWEL OBSTRUCTION, ACUTE CHOLECYSTITIS, RUPTURED DIVERTICULITIS, INCARCERATED or STRANGULATED HERNIA, HEMMORHAGIC PANCREATITIS, PERFORATED BOWEL/ULCER, ECTOPIC PREGNANCY, OVARIAN TORSION or TUBO-OVARIAN ABSCESS thus I consider the discharge disposition reasonable.  Yasmeen Bajwa (or their surrogate) and I have discussed the diagnosis and risks, and we agree with discharging home with close follow-up. We also discussed returning to the Emergency Department immediately if new or worsening symptoms occur. We have discussed the symptoms which are most concerning that necessitate immediate return. FINAL IMPRESSION      1. Acute cystitis without hematuria    2.  Acute nonintractable headache, unspecified headache type          DISPOSITION/PLAN   DISPOSITION Decision To Discharge 11/04/2021 01:24:00 PM      PATIENT REFERRED TO:  Gem Manley MD  [address] [place] [address]    In 1 day      [facility] Emergency Department  [address] [place] [address]  [phone]    If symptoms worsen    your OB/gyn  as we discussed        [facility] - Neurology  [place] [address]  [phone]          DISCHARGE MEDICATIONS:  Discharge Medication List as of 11/4/2021  1:26 PM      START taking these medications    Details   cefUROXime (CEFTIN) 250 MG tablet Take 1 tablet by mouth 2 times daily for 7 days, Disp-14 tablet, R-0Print      ondansetron (ZOFRAN ODT) 4 MG disintegrating tablet Take 1 tablet by mouth every 8 hours as needed for Nausea, Disp-20 tablet, R-0Print                (Please note that portions of this note were completed with a voice recognition program.Efforts were made to edit the dictations but occasionally words are mis-transcribed.)    Lavinia Hatfield MD (electronically signed)  Attending Emergency Physician          Lavinia Hatfield MD  11/05/21 7192 on normal saline monitor bmp , no indication for 3 percent saline

## 2021-11-05 LAB — URINE CULTURE, ROUTINE: NORMAL

## 2023-01-06 ENCOUNTER — HOSPITAL ENCOUNTER (EMERGENCY)
Age: 23
Discharge: HOME | End: 2023-01-07
Payer: COMMERCIAL

## 2023-01-06 ENCOUNTER — APPOINTMENT (OUTPATIENT)
Dept: CT IMAGING | Age: 23
End: 2023-01-06
Payer: COMMERCIAL

## 2023-01-06 DIAGNOSIS — R19.7 DIARRHEA, UNSPECIFIED TYPE: ICD-10-CM

## 2023-01-06 DIAGNOSIS — R10.30 LOWER ABDOMINAL PAIN: Primary | ICD-10-CM

## 2023-01-06 DIAGNOSIS — N93.9 ABNORMAL UTERINE BLEEDING (AUB): ICD-10-CM

## 2023-01-06 LAB
A/G RATIO: 1.3 (ref 1.1–2.2)
ALBUMIN SERPL-MCNC: 4 G/DL (ref 3.4–5)
ALP BLD-CCNC: 67 U/L (ref 40–129)
ALT SERPL-CCNC: 11 U/L (ref 10–40)
ANION GAP SERPL CALCULATED.3IONS-SCNC: 8 MMOL/L (ref 3–16)
AST SERPL-CCNC: 18 U/L (ref 15–37)
BACTERIA: NORMAL /HPF
BASOPHILS ABSOLUTE: 0 K/UL (ref 0–0.2)
BASOPHILS RELATIVE PERCENT: 0.5 %
BILIRUB SERPL-MCNC: <0.2 MG/DL (ref 0–1)
BILIRUBIN URINE: NEGATIVE
BLOOD, URINE: NEGATIVE
BUN BLDV-MCNC: 12 MG/DL (ref 7–20)
CALCIUM SERPL-MCNC: 9.1 MG/DL (ref 8.3–10.6)
CHLORIDE BLD-SCNC: 104 MMOL/L (ref 99–110)
CLARITY: ABNORMAL
CO2: 25 MMOL/L (ref 21–32)
COLOR: YELLOW
CREAT SERPL-MCNC: 0.6 MG/DL (ref 0.6–1.1)
EOSINOPHILS ABSOLUTE: 0.1 K/UL (ref 0–0.6)
EOSINOPHILS RELATIVE PERCENT: 1.9 %
EPITHELIAL CELLS, UA: 1 /HPF (ref 0–5)
GFR SERPL CREATININE-BSD FRML MDRD: >60 ML/MIN/{1.73_M2}
GLUCOSE BLD-MCNC: 100 MG/DL (ref 70–99)
GLUCOSE URINE: NEGATIVE MG/DL
HCG(URINE) PREGNANCY TEST: NEGATIVE
HCT VFR BLD CALC: 39.8 % (ref 36–48)
HEMOGLOBIN: 13.1 G/DL (ref 12–16)
HYALINE CASTS: 0 /LPF (ref 0–8)
KETONES, URINE: NEGATIVE MG/DL
LEUKOCYTE ESTERASE, URINE: NEGATIVE
LYMPHOCYTES ABSOLUTE: 2.1 K/UL (ref 1–5.1)
LYMPHOCYTES RELATIVE PERCENT: 45.7 %
MCH RBC QN AUTO: 28.9 PG (ref 26–34)
MCHC RBC AUTO-ENTMCNC: 32.9 G/DL (ref 31–36)
MCV RBC AUTO: 87.7 FL (ref 80–100)
MICROSCOPIC EXAMINATION: YES
MONOCYTES ABSOLUTE: 0.6 K/UL (ref 0–1.3)
MONOCYTES RELATIVE PERCENT: 12.3 %
NEUTROPHILS ABSOLUTE: 1.8 K/UL (ref 1.7–7.7)
NEUTROPHILS RELATIVE PERCENT: 39.6 %
NITRITE, URINE: NEGATIVE
PDW BLD-RTO: 13.1 % (ref 12.4–15.4)
PH UA: 7 (ref 5–8)
PLATELET # BLD: 221 K/UL (ref 135–450)
PMV BLD AUTO: 7.6 FL (ref 5–10.5)
POTASSIUM SERPL-SCNC: 3.9 MMOL/L (ref 3.5–5.1)
PROTEIN UA: NEGATIVE MG/DL
RBC # BLD: 4.54 M/UL (ref 4–5.2)
RBC UA: 1 /HPF (ref 0–4)
SODIUM BLD-SCNC: 137 MMOL/L (ref 136–145)
SPECIFIC GRAVITY UA: 1.02 (ref 1–1.03)
TOTAL PROTEIN: 7 G/DL (ref 6.4–8.2)
URINE REFLEX TO CULTURE: ABNORMAL
URINE TYPE: ABNORMAL
UROBILINOGEN, URINE: 0.2 E.U./DL
WBC # BLD: 4.6 K/UL (ref 4–11)
WBC UA: 1 /HPF (ref 0–5)

## 2023-01-06 PROCEDURE — 74177 CT ABD & PELVIS W/CONTRAST: CPT

## 2023-01-06 PROCEDURE — 85025 COMPLETE CBC W/AUTO DIFF WBC: CPT

## 2023-01-06 PROCEDURE — 84703 CHORIONIC GONADOTROPIN ASSAY: CPT

## 2023-01-06 PROCEDURE — 96374 THER/PROPH/DIAG INJ IV PUSH: CPT

## 2023-01-06 PROCEDURE — 6360000002 HC RX W HCPCS: Performed by: NURSE PRACTITIONER

## 2023-01-06 PROCEDURE — 36415 COLL VENOUS BLD VENIPUNCTURE: CPT

## 2023-01-06 PROCEDURE — 80053 COMPREHEN METABOLIC PANEL: CPT

## 2023-01-06 PROCEDURE — 6360000004 HC RX CONTRAST MEDICATION: Performed by: NURSE PRACTITIONER

## 2023-01-06 PROCEDURE — 81001 URINALYSIS AUTO W/SCOPE: CPT

## 2023-01-06 PROCEDURE — 2580000003 HC RX 258: Performed by: NURSE PRACTITIONER

## 2023-01-06 PROCEDURE — 99285 EMERGENCY DEPT VISIT HI MDM: CPT

## 2023-01-06 RX ORDER — KETOROLAC TROMETHAMINE 30 MG/ML
30 INJECTION, SOLUTION INTRAMUSCULAR; INTRAVENOUS ONCE
Status: COMPLETED | OUTPATIENT
Start: 2023-01-06 | End: 2023-01-06

## 2023-01-06 RX ORDER — 0.9 % SODIUM CHLORIDE 0.9 %
1000 INTRAVENOUS SOLUTION INTRAVENOUS ONCE
Status: COMPLETED | OUTPATIENT
Start: 2023-01-06 | End: 2023-01-07

## 2023-01-06 RX ADMIN — IOPAMIDOL 75 ML: 755 INJECTION, SOLUTION INTRAVENOUS at 23:42

## 2023-01-06 RX ADMIN — SODIUM CHLORIDE 1000 ML: 9 INJECTION, SOLUTION INTRAVENOUS at 23:31

## 2023-01-06 RX ADMIN — KETOROLAC TROMETHAMINE 30 MG: 30 INJECTION, SOLUTION INTRAMUSCULAR at 23:32

## 2023-01-06 ASSESSMENT — LIFESTYLE VARIABLES
HOW OFTEN DO YOU HAVE A DRINK CONTAINING ALCOHOL: NEVER
HOW MANY STANDARD DRINKS CONTAINING ALCOHOL DO YOU HAVE ON A TYPICAL DAY: PATIENT DOES NOT DRINK

## 2023-01-06 ASSESSMENT — PAIN DESCRIPTION - ORIENTATION: ORIENTATION: LOWER;MID

## 2023-01-06 ASSESSMENT — PAIN SCALES - GENERAL
PAINLEVEL_OUTOF10: 5
PAINLEVEL_OUTOF10: 7

## 2023-01-06 ASSESSMENT — PAIN DESCRIPTION - LOCATION: LOCATION: ABDOMEN

## 2023-01-07 VITALS
SYSTOLIC BLOOD PRESSURE: 100 MMHG | RESPIRATION RATE: 18 BRPM | DIASTOLIC BLOOD PRESSURE: 71 MMHG | OXYGEN SATURATION: 100 % | TEMPERATURE: 97.5 F | HEART RATE: 71 BPM

## 2023-01-07 NOTE — ED NOTES
Discharge and education instructions reviewed. Patient verbalized understanding, teach-back successful. Patient denied questions at this time. No acute distress noted. Patient instructed to follow-up as noted - return to emergency department if symptoms worsen. Patient verbalized understanding. Discharged per EDMD with discharge instructions.           Cruzito Aguiar RN  01/07/23 0881

## 2023-01-07 NOTE — ED TRIAGE NOTES
Patient came to ed complaint of lower mid abdominal pain. Patient states she has not had her period in about 2 months but has been bleeding for 2 weeks. Patient does not think this is her period because the pain is severe. Patient rates pain 7/10. Patient states she feels constant cramping.  Patient is a/o x4

## 2023-01-07 NOTE — ED PROVIDER NOTES
[address] [facility]  [address] [address]  Dept: [phone]  Loc: [address] ENCOUNTER        This patient was not seen or evaluated by the attending physician. I evaluated this patient, the attending physician was available for consultation. CHIEF COMPLAINT    Chief Complaint   Patient presents with    Abdominal Pain     Patient came to ed complaint of lower mid abdominal pain. Patient states she has not had her period in about 2 months but has been bleeding for 2 weeks. Patient does not think this is her period because the pain is severe. Patient rates pain 7/10. Patient states she feels constant cramping. Patient is a/o x4       HPI    Tresa Robbins is a 25 y.o. female who presents irregular vaginal bleeding for the last 2 months. She has had some lower abdominal tenderness and soreness with it. She states she is recently started Depo and she has had 3 injections of it and she thinks that this is messing up her periods. She is sexually active. She denies concern for STD. She denies unusual vaginal discharge or odor. She denies bodies, fever, chills, nausea, vomiting or diarrhea. REVIEW OF SYSTEMS    General: No fevers or chills  : no dysuria, no flank pain  GI: No vomiting or diarrhea  Pulmonary: No difficulty breathing or cough  Neurologic: No loss of consciousness or syncope  See HPI for further details. All other systems reviewed and are negative. PAST MEDICAL & SURGICAL HISTORY    History reviewed. No pertinent past medical history. History reviewed. No pertinent surgical history.     CURRENT MEDICATIONS    Current Outpatient Rx   Medication Sig Dispense Refill    ondansetron (ZOFRAN ODT) 4 MG disintegrating tablet Take 1 tablet by mouth every 8 hours as needed for Nausea 20 tablet 0    naproxen (NAPROSYN) 500 MG tablet Take 1 tablet by mouth 2 times daily (with meals) 60 tablet 5    dicyclomine (BENTYL) 10 MG capsule Take 1 capsule by mouth every 6 hours as needed (cramps) 20 capsule 0    norethindrone (MICRONOR) 0.35 MG tablet TAKE 1 TABLET BY MOUTH EVERY DAY      ibuprofen (ADVIL;MOTRIN) 600 MG tablet Take 1 tablet by mouth 4 times daily as needed for Pain 40 tablet 0    acetaminophen (TYLENOL) 325 MG tablet Take 650 mg by mouth every 6 hours as needed for Pain         ALLERGIES    Allergies   Allergen Reactions    Latex        FAMILY AND SOCIAL HISTORY    History reviewed. No pertinent family history. Social History     Socioeconomic History    Marital status: Single     Spouse name: None    Number of children: None    Years of education: None    Highest education level: None   Tobacco Use    Smoking status: Never    Smokeless tobacco: Never   Vaping Use    Vaping Use: Never used   Substance and Sexual Activity    Alcohol use: No    Drug use: No    Sexual activity: Yes     Partners: Male       PHYSICAL EXAM    VITAL SIGNS: /71   Pulse 71   Temp 97.5 °F (36.4 °C) (Oral)   Resp 18   LMP  (LMP Unknown)   SpO2 100%    Constitutional:  Well-developed, well-nourished, appears comfortable  Eyes:  Non-icteric sclera, no conjunctival injection   HENT:  Atraumatic, external nose normal.   NECK: Supple, no JVD   Respiratory:  No respiratory distress, normal breath sounds   Cardiovascular:  regular rate, no murmurs  GI:  Soft, no abdominal tenderness, bowel sounds unremarkable   Integument:  Nondiaphoretic skin, no obvious rashes  Neurologic: Awake and oriented, no slurred speech    RADIOLOGY/PROCEDURES    CT ABDOMEN PELVIS W IV CONTRAST Additional Contrast? None   Final Result   No acute abnormality identified.            Labs Reviewed   COMPREHENSIVE METABOLIC PANEL - Abnormal; Notable for the following components:       Result Value    Glucose 100 (*)     All other components within normal limits   URINALYSIS WITH REFLEX TO CULTURE - Abnormal; Notable for the following components:    Clarity, UA CLOUDY (*)     All other components within normal limits   CBC WITH AUTO DIFFERENTIAL   PREGNANCY, URINE   MICROSCOPIC URINALYSIS       ED COURSE & MEDICAL DECISION MAKING    Pertinent Labs & Imaging studies reviewed and interpreted. (See chart for details)  See chart for details of medications given during the ED stay. Vitals:    01/06/23 2114 01/07/23 0026 01/07/23 0047   BP: 108/77  100/71   Pulse:  63 71   Resp: 18  18   Temp: 97.5 °F (36.4 °C)     TempSrc: Oral     SpO2: 97%  100%     Medications   0.9 % sodium chloride bolus (0 mLs IntraVENous Stopped 1/7/23 0035)   ketorolac (TORADOL) injection 30 mg (30 mg IntraVENous Given 1/6/23 2332)   iopamidol (ISOVUE-370) 76 % injection 75 mL (75 mLs IntraVENous Given 1/6/23 2342)     I have seen and evaluated this patient. My attending physician was available for consultation. Differential diagnosis: dysfunctional uterine bleeding, cervical laceration, vaginal laceration, ectopic pregnancy, molar pregnancy, miscarriage, hemorrhagic Shock, Rh incompatibility, UTI, placenta previa, other    Patient is afebrile and nontoxic in appearance. She is having some breakthrough bleeding or irregular menstrual bleeding. She has had some issues ever since starting her Depo shots. White blood cell count 4.6 with no anemia    Chemistry panel is unremarkable with a serum glucose of 100. LFTs are unremarkable    Urinalysis is unremarkable for infection. Urine pregnancy is negative. CT of the abdomen and pelvis with IV contrast reviewed by myself and I agree with the radiologist interpretation showing  Impression   No acute abnormality identified. Was given 1 L of IV normal saline bolus and 30 mg of IV Toradol for discomfort. On reevaluation she stated that she was feeling better. She is not nauseated. She is not vomiting. Her work-up today is reassuring.   She was instructed to follow-up with her primary care physician as needed and I gave her a gynecology for [facility] referral for follow-up. Otherwise instructed to return to the emergency department for worsening symptoms. Patient verbalized understanding of the discharge instructions and she ambulated out of the emergency department with a steady gait. FINAL IMPRESSION    1. Lower abdominal pain    2. Diarrhea, unspecified type    3.  Abnormal uterine bleeding (AUB)        PLAN  Discharge with outpatient follow-up    (Please note that this note was completed with a voice recognition program.  Every attempt was made to edit the dictations, but inevitably there remain words that are mis-transcribed.)           AMY Guevara - MONALISA  01/09/23 8453

## 2023-08-12 ENCOUNTER — HOSPITAL ENCOUNTER (EMERGENCY)
Age: 23
Discharge: HOME | End: 2023-08-12
Payer: COMMERCIAL

## 2023-08-12 VITALS
HEIGHT: 61 IN | WEIGHT: 132.28 LBS | RESPIRATION RATE: 12 BRPM | TEMPERATURE: 98.7 F | DIASTOLIC BLOOD PRESSURE: 78 MMHG | BODY MASS INDEX: 24.97 KG/M2 | SYSTOLIC BLOOD PRESSURE: 118 MMHG | OXYGEN SATURATION: 100 % | HEART RATE: 78 BPM

## 2023-08-12 DIAGNOSIS — R23.8: Primary | ICD-10-CM

## 2023-08-12 PROCEDURE — 99283 EMERGENCY DEPT VISIT LOW MDM: CPT

## 2023-08-12 RX ORDER — VALACYCLOVIR HYDROCHLORIDE 1 G/1
1000 TABLET, FILM COATED ORAL 2 TIMES DAILY
Qty: 20 TABLET | Refills: 0 | Status: SHIPPED | OUTPATIENT
Start: 2023-08-12 | End: 2023-08-22

## 2023-08-12 ASSESSMENT — PAIN SCALES - GENERAL: PAINLEVEL_OUTOF10: 0

## 2023-08-12 ASSESSMENT — LIFESTYLE VARIABLES: HOW OFTEN DO YOU HAVE A DRINK CONTAINING ALCOHOL: MONTHLY OR LESS

## 2023-08-12 ASSESSMENT — PAIN - FUNCTIONAL ASSESSMENT: PAIN_FUNCTIONAL_ASSESSMENT: NONE - DENIES PAIN

## 2023-08-12 NOTE — ED PROVIDER NOTES
[address] [facility] [address]  Dept: [phone]  Loc: [address] [facility] ENCOUNTER        This patient was not seen or evaluated by the attending physician. I evaluated this patient, the attending physician was available for consultation. CHIEF COMPLAINT    Chief Complaint   Patient presents with    Blister     Around mouth, states it itches and burns, noticed them 2 days ago       HPI    Brinda Alvarado is a 26 y.o. female who presents with a rash circumorally of \"blisters\" around the mouth. She states that the itching burn. Onset was 2 days ago. The duration has been constant. Circumorally only, no palms or intraoral lesions otherwise. Patient complains of associated burning and itching. The patient denies any new foods, medications, soaps, lotions or detergents. Did get lip filler injections 2 months ago . but nothing recently. No new lipsticks lip glasses or facial cleansers or lotions. REVIEW OF SYSTEMS    Pulmonary: No difficulty breathing or chest tightness  Skin: see HPI  ENT: No throat swelling or tongue swelling  General: No fevers or syncope  All other systems reviewed and are negative. PAST MEDICAL & SURGICAL HISTORY    History reviewed. No pertinent past medical history. History reviewed. No pertinent surgical history.     CURRENT MEDICATIONS  (may include discharge medications prescribed in the ED)  Current Outpatient Rx   Medication Sig Dispense Refill    valACYclovir (VALTREX) 1 g tablet Take 1 tablet by mouth 2 times daily for 10 days 20 tablet 0    ondansetron (ZOFRAN ODT) 4 MG disintegrating tablet Take 1 tablet by mouth every 8 hours as needed for Nausea 20 tablet 0    naproxen (NAPROSYN) 500 MG tablet Take 1 tablet by mouth 2 times daily (with meals) 60 tablet 5    dicyclomine (BENTYL) 10 MG capsule Take 1 capsule by mouth every 6 hours as needed (cramps) 20 capsule 0

## 2023-12-11 ENCOUNTER — HOSPITAL ENCOUNTER (EMERGENCY)
Age: 23
Discharge: HOME | End: 2023-12-11
Payer: COMMERCIAL

## 2023-12-11 VITALS
HEIGHT: 61 IN | SYSTOLIC BLOOD PRESSURE: 118 MMHG | OXYGEN SATURATION: 96 % | RESPIRATION RATE: 16 BRPM | HEART RATE: 110 BPM | DIASTOLIC BLOOD PRESSURE: 77 MMHG | TEMPERATURE: 99.2 F | BODY MASS INDEX: 24.81 KG/M2 | WEIGHT: 131.39 LBS

## 2023-12-11 DIAGNOSIS — J02.9 ACUTE PHARYNGITIS, UNSPECIFIED ETIOLOGY: Primary | ICD-10-CM

## 2023-12-11 DIAGNOSIS — Z32.02 NEGATIVE PREGNANCY TEST: ICD-10-CM

## 2023-12-11 LAB — HCG UR QL: NEGATIVE

## 2023-12-11 PROCEDURE — 6370000000 HC RX 637 (ALT 250 FOR IP): Performed by: PHYSICIAN ASSISTANT

## 2023-12-11 PROCEDURE — 84703 CHORIONIC GONADOTROPIN ASSAY: CPT

## 2023-12-11 PROCEDURE — 99283 EMERGENCY DEPT VISIT LOW MDM: CPT

## 2023-12-11 PROCEDURE — 6360000002 HC RX W HCPCS: Performed by: PHYSICIAN ASSISTANT

## 2023-12-11 RX ORDER — PENICILLIN V POTASSIUM 500 MG/1
500 TABLET ORAL 3 TIMES DAILY
Qty: 30 TABLET | Refills: 0 | Status: SHIPPED | OUTPATIENT
Start: 2023-12-11 | End: 2023-12-11 | Stop reason: SDUPTHER

## 2023-12-11 RX ORDER — DEXAMETHASONE 4 MG/1
10 TABLET ORAL ONCE
Status: COMPLETED | OUTPATIENT
Start: 2023-12-11 | End: 2023-12-11

## 2023-12-11 RX ORDER — PENICILLIN V POTASSIUM 500 MG/1
500 TABLET ORAL 3 TIMES DAILY
Qty: 30 TABLET | Refills: 0 | Status: SHIPPED | OUTPATIENT
Start: 2023-12-11 | End: 2023-12-21

## 2023-12-11 RX ADMIN — DEXAMETHASONE 10 MG: 4 TABLET ORAL at 17:08

## 2023-12-11 RX ADMIN — IBUPROFEN 600 MG: 200 TABLET, FILM COATED ORAL at 17:08

## 2023-12-11 NOTE — ED NOTES
D/C: Order noted for d/c. Pt confirmed d/c paperwork have correct name. Discharge and education instructions reviewed with patient. Teach-back successful. Pt verbalized understanding and denied questions at this time. No acute distress noted. Patient instructed to follow-up as noted - return to emergency department if symptoms worsen. Patient verbalized understanding. Discharged per EDMD with discharge instructions. Pt discharged to private vehicle. Patient stable upon departure. Provider aware of patient pain at time of discharge.       Vern Basurto RN  12/11/23 6083

## 2023-12-11 NOTE — ED PROVIDER NOTES
[address] [facility] [address] [facility] [address] [place] [address] [address]        Pt Name: Brinda Alvarado  MRN: [id number]  [address] [facility] 2000  Date of evaluation: 12/11/2023  Provider: Sherley Horvath PA-C  PCP: Tirso John MD  Note Started: 5:32 PM EST 12/11/23      CHRIS. I have evaluated this patient. CHIEF COMPLAINT       Chief Complaint   Patient presents with    Pharyngitis     Pt reports sore throat for the last 3 days. Pt states that symptoms haven't improved. Pt alert and oriented at this time with no signs of distress noted. Pt denies being in contact with anyone with strep. HISTORY OF PRESENT ILLNESS: 1 or more Elements     History From: patient  Limitations to history : None    Brinda Alvarado is a 26 y.o. female who presents to the emergency department by private vehicle complaining of sore throat, fevers and generalized ill feeling. No known sick contacts. Denies any cough, congestion, rhinorrhea. Symptoms have been ongoing for the past 3 days with progressive worsening. Patient reports pain and difficulty swallowing. Still is able to swallow food and liquids. Secondly, patient requesting pregnancy test.  Patient states she was 2 weeks late on her last menstrual cycle and then had an abnormal cycle. Nursing Notes were all reviewed and agreed with or any disagreements were addressed in the HPI. REVIEW OF SYSTEMS :      Review of Systems    Positives and Pertinent negatives as per HPI. SURGICAL HISTORY   History reviewed. No pertinent surgical history.      [facility]       Discharge Medication List as of 12/11/2023  5:34 PM        CONTINUE these medications which have NOT CHANGED    Details   ondansetron (ZOFRAN ODT) 4 MG disintegrating tablet Take 1 tablet by mouth every 8 hours as needed for Nausea, Disp-20 tablet, R-0Print      naproxen (NAPROSYN) 500 MG tablet Take 1 tablet by mouth 2 times daily (with meals), Disp-60 tablet, R-5Normal

## 2024-01-23 ENCOUNTER — HOSPITAL ENCOUNTER (EMERGENCY)
Age: 24
Discharge: HOME | End: 2024-01-23
Payer: COMMERCIAL

## 2024-01-23 VITALS
WEIGHT: 130 LBS | OXYGEN SATURATION: 97 % | HEART RATE: 78 BPM | SYSTOLIC BLOOD PRESSURE: 114 MMHG | TEMPERATURE: 98 F | DIASTOLIC BLOOD PRESSURE: 70 MMHG | RESPIRATION RATE: 16 BRPM | HEIGHT: 61 IN | BODY MASS INDEX: 24.55 KG/M2

## 2024-01-23 DIAGNOSIS — S60.416A ABRASION OF RIGHT LITTLE FINGER, INITIAL ENCOUNTER: Primary | ICD-10-CM

## 2024-01-23 PROCEDURE — 99282 EMERGENCY DEPT VISIT SF MDM: CPT

## 2024-01-23 ASSESSMENT — PAIN - FUNCTIONAL ASSESSMENT: PAIN_FUNCTIONAL_ASSESSMENT: NONE - DENIES PAIN

## 2024-01-23 NOTE — ED NOTES
Attempted to locate patient to review discharge instructions and administer ordered medications, unable to administer medications.

## 2024-01-24 ASSESSMENT — ENCOUNTER SYMPTOMS
VOMITING: 0
COUGH: 0
SHORTNESS OF BREATH: 0
EYE PAIN: 0
BACK PAIN: 0
ABDOMINAL PAIN: 0

## 2024-01-24 NOTE — ED PROVIDER NOTES
[facility] ED  EMERGENCY DEPARTMENT ENCOUNTER        Pt Name: Dorota Sheppard  MRN: [id number]  Birthdate 2000  Date of evaluation: 1/23/2024  Provider: AMY Jeronimo - MONALISA  PCP: Nina Murray MD  Note Started: 3:26 PM EST 1/24/24      CHRIS. I have evaluated this patient.        CHIEF COMPLAINT       Chief Complaint   Patient presents with    Laceration     Pt cut ring finger on right hand on glass last night, pt states she has nothing to clean or dress with at home, pt states area burns       HISTORY OF PRESENT ILLNESS: 1 or more Elements     History From: Patient    Limitations to history : None    Social Determinants Significantly Affecting Health : None    Chief Complaint: Abrasion to the right little finger    Dorota Sheppard is a 23 y.o. female who presents to the emergency department today after she had a glass caused an abrasion to her right little finger.  States that the pad of the finger has a small abrasion which had some occasional bleeding yesterday and last night.  Denies any laceration.  States that her tetanus vaccine is unknown .  She has no other acute concerns at this time.    Nursing Notes were all reviewed and agreed with or any disagreements were addressed in the HPI.    REVIEW OF SYSTEMS :      Review of Systems   Constitutional:  Negative for fever.   HENT:  Negative for congestion and ear pain.    Eyes:  Negative for pain and visual disturbance.   Respiratory:  Negative for cough and shortness of breath.    Cardiovascular:  Negative for chest pain.   Gastrointestinal:  Negative for abdominal pain and vomiting.   Genitourinary:  Negative for dysuria, flank pain and frequency.   Musculoskeletal:  Negative for back pain and neck pain.   Skin:  Negative for rash.        Small abrasion to the right little finger    Neurological:  Negative for dizziness and light-headedness.       Positives and Pertinent negatives as per HPI.     SURGICAL HISTORY   History

## 2024-01-29 ENCOUNTER — APPOINTMENT (OUTPATIENT)
Dept: ULTRASOUND IMAGING | Age: 24
End: 2024-01-29
Payer: COMMERCIAL

## 2024-01-29 ENCOUNTER — HOSPITAL ENCOUNTER (EMERGENCY)
Age: 24
Discharge: HOME | End: 2024-01-29
Payer: COMMERCIAL

## 2024-01-29 VITALS
SYSTOLIC BLOOD PRESSURE: 98 MMHG | HEART RATE: 64 BPM | HEIGHT: 61 IN | TEMPERATURE: 98 F | DIASTOLIC BLOOD PRESSURE: 51 MMHG | RESPIRATION RATE: 15 BRPM | OXYGEN SATURATION: 99 % | WEIGHT: 135.58 LBS | BODY MASS INDEX: 25.6 KG/M2

## 2024-01-29 DIAGNOSIS — B96.89 BACTERIAL VAGINOSIS: Primary | ICD-10-CM

## 2024-01-29 DIAGNOSIS — R10.2 PELVIC PAIN IN FEMALE: ICD-10-CM

## 2024-01-29 DIAGNOSIS — N76.0 BACTERIAL VAGINOSIS: Primary | ICD-10-CM

## 2024-01-29 LAB
ALBUMIN SERPL-MCNC: 4.4 G/DL (ref 3.4–5)
ALBUMIN/GLOB SERPL: 1.3 {RATIO} (ref 1.1–2.2)
ALP SERPL-CCNC: 70 U/L (ref 40–129)
ALT SERPL-CCNC: 63 U/L (ref 10–40)
ANION GAP SERPL CALCULATED.3IONS-SCNC: 11 MMOL/L (ref 3–16)
AST SERPL-CCNC: 152 U/L (ref 15–37)
BACTERIA GENITAL QL WET PREP: ABNORMAL
BACTERIA URNS QL MICRO: ABNORMAL /HPF
BASOPHILS # BLD: 0 K/UL (ref 0–0.2)
BASOPHILS NFR BLD: 0.4 %
BILIRUB SERPL-MCNC: 0.5 MG/DL (ref 0–1)
BILIRUB UR QL STRIP.AUTO: NEGATIVE
BUN SERPL-MCNC: 13 MG/DL (ref 7–20)
CALCIUM SERPL-MCNC: 9.2 MG/DL (ref 8.3–10.6)
CHLORIDE SERPL-SCNC: 99 MMOL/L (ref 99–110)
CLARITY UR: ABNORMAL
CLUE CELLS SPEC QL WET PREP: ABNORMAL
CO2 SERPL-SCNC: 24 MMOL/L (ref 21–32)
COLOR UR: YELLOW
CREAT SERPL-MCNC: 0.6 MG/DL (ref 0.6–1.1)
DEPRECATED RDW RBC AUTO: 14.1 % (ref 12.4–15.4)
EOSINOPHIL # BLD: 0 K/UL (ref 0–0.6)
EOSINOPHIL NFR BLD: 0.4 %
EPI CELLS #/AREA URNS AUTO: 21 /HPF (ref 0–5)
EPI CELLS SPEC QL WET PREP: ABNORMAL
GFR SERPLBLD CREATININE-BSD FMLA CKD-EPI: >60 ML/MIN/{1.73_M2}
GLUCOSE SERPL-MCNC: 89 MG/DL (ref 70–99)
GLUCOSE UR STRIP.AUTO-MCNC: NEGATIVE MG/DL
HCG UR QL: NEGATIVE
HCT VFR BLD AUTO: 41.5 % (ref 36–48)
HGB BLD-MCNC: 13.9 G/DL (ref 12–16)
HGB UR QL STRIP.AUTO: NEGATIVE
HYALINE CASTS #/AREA URNS AUTO: 0 /LPF (ref 0–8)
KETONES UR STRIP.AUTO-MCNC: NEGATIVE MG/DL
LEUKOCYTE ESTERASE UR QL STRIP.AUTO: ABNORMAL
LYMPHOCYTES # BLD: 2.6 K/UL (ref 1–5.1)
LYMPHOCYTES NFR BLD: 30.2 %
MCH RBC QN AUTO: 29.1 PG (ref 26–34)
MCHC RBC AUTO-ENTMCNC: 33.5 G/DL (ref 31–36)
MCV RBC AUTO: 87.1 FL (ref 80–100)
MONOCYTES # BLD: 0.6 K/UL (ref 0–1.3)
MONOCYTES NFR BLD: 6.7 %
NEUTROPHILS # BLD: 5.3 K/UL (ref 1.7–7.7)
NEUTROPHILS NFR BLD: 62.3 %
NITRITE UR QL STRIP.AUTO: NEGATIVE
PH UR STRIP.AUTO: 5.5 [PH] (ref 5–8)
PLATELET # BLD AUTO: 318 K/UL (ref 135–450)
PMV BLD AUTO: 7.5 FL (ref 5–10.5)
POTASSIUM SERPL-SCNC: 4.1 MMOL/L (ref 3.5–5.1)
PROT SERPL-MCNC: 7.7 G/DL (ref 6.4–8.2)
PROT UR STRIP.AUTO-MCNC: NEGATIVE MG/DL
RBC # BLD AUTO: 4.77 M/UL (ref 4–5.2)
RBC CLUMPS #/AREA URNS AUTO: 0 /HPF (ref 0–4)
RBC SPEC QL WET PREP: ABNORMAL
SODIUM SERPL-SCNC: 134 MMOL/L (ref 136–145)
SP GR UR STRIP.AUTO: 1.02 (ref 1–1.03)
SPECIMEN SOURCE FLD: ABNORMAL
T VAGINALIS GENITAL QL WET PREP: ABNORMAL
UA COMPLETE W REFLEX CULTURE PNL UR: YES
UA DIPSTICK W REFLEX MICRO PNL UR: YES
URN SPEC COLLECT METH UR: ABNORMAL
UROBILINOGEN UR STRIP-ACNC: 0.2 E.U./DL
WBC # BLD AUTO: 8.5 K/UL (ref 4–11)
WBC #/AREA URNS AUTO: 24 /HPF (ref 0–5)
WBC SPEC QL WET PREP: ABNORMAL
YEAST GENITAL QL WET PREP: ABNORMAL

## 2024-01-29 PROCEDURE — 80053 COMPREHEN METABOLIC PANEL: CPT

## 2024-01-29 PROCEDURE — 87210 SMEAR WET MOUNT SALINE/INK: CPT

## 2024-01-29 PROCEDURE — 87491 CHLMYD TRACH DNA AMP PROBE: CPT

## 2024-01-29 PROCEDURE — 85025 COMPLETE CBC W/AUTO DIFF WBC: CPT

## 2024-01-29 PROCEDURE — 87086 URINE CULTURE/COLONY COUNT: CPT

## 2024-01-29 PROCEDURE — 87591 N.GONORRHOEAE DNA AMP PROB: CPT

## 2024-01-29 PROCEDURE — 84703 CHORIONIC GONADOTROPIN ASSAY: CPT

## 2024-01-29 PROCEDURE — 36415 COLL VENOUS BLD VENIPUNCTURE: CPT

## 2024-01-29 PROCEDURE — 93975 VASCULAR STUDY: CPT

## 2024-01-29 PROCEDURE — 81001 URINALYSIS AUTO W/SCOPE: CPT

## 2024-01-29 PROCEDURE — 99284 EMERGENCY DEPT VISIT MOD MDM: CPT

## 2024-01-29 PROCEDURE — 76830 TRANSVAGINAL US NON-OB: CPT

## 2024-01-29 RX ORDER — METRONIDAZOLE 500 MG/1
500 TABLET ORAL 2 TIMES DAILY
Qty: 14 TABLET | Refills: 0 | Status: SHIPPED | OUTPATIENT
Start: 2024-01-29 | End: 2024-02-05

## 2024-01-29 ASSESSMENT — PAIN SCALES - GENERAL
PAINLEVEL_OUTOF10: 4
PAINLEVEL_OUTOF10: 4

## 2024-01-29 ASSESSMENT — LIFESTYLE VARIABLES
HOW OFTEN DO YOU HAVE A DRINK CONTAINING ALCOHOL: 2-4 TIMES A MONTH
HOW MANY STANDARD DRINKS CONTAINING ALCOHOL DO YOU HAVE ON A TYPICAL DAY: 1 OR 2

## 2024-01-29 ASSESSMENT — PAIN - FUNCTIONAL ASSESSMENT: PAIN_FUNCTIONAL_ASSESSMENT: 0-10

## 2024-01-29 ASSESSMENT — PAIN DESCRIPTION - LOCATION: LOCATION: ABDOMEN

## 2024-01-29 ASSESSMENT — PAIN DESCRIPTION - PAIN TYPE: TYPE: ACUTE PAIN

## 2024-01-29 NOTE — ED TRIAGE NOTES
Pt into ER from home with c/c Lower abd pain with diarrhea and nausea onset 1.5 week ago.  Pt denies vomiting or urinary symptoms.  Pt took Plan B just before symptoms started.

## 2024-01-29 NOTE — ED PROVIDER NOTES
[facility] EMERGENCY DEPARTMENT  EMERGENCY DEPARTMENT ENCOUNTER      Pt Name: Dorota Sheppard  MRN: [id number]  Birthdate 2000  Date of evaluation: 1/29/2024  Provider: ALICE Desir  PCP: Nina Murray MD  Note Started: 6:20 PM EST     The ED Attending Physician was available for consultation but did not see or evaluate this patient.    CHIEF COMPLAINT       Chief Complaint   Patient presents with    Abdominal Pain     Lower abd pain with diarrhea and nausea onset 1.5 week ago.  Pt denies vomiting or urinary symptoms.  Pt took Plan B just before symptoms started.         HISTORY OF PRESENT ILLNESS   (Location, Timing/Onset, Context/Setting, Quality, Duration, Modifying Factors, Severity, Associated Signs and Symptoms)  Note limiting factors.     Dorota Sheppard is a 23 y.o. female who presents with complaint of persistent pain in the pelvic region for the last week and a half or so.  She says she had unprotected sex and then a few hours later took a Plan B pill, and symptoms began shortly after that.  Says she thinks she had taken Plan B once before in the past, quite a while ago, does not recall having any bad reaction to it.  She says soon after the pelvic cramping started she began having some menstrual bleeding, and it proceeded somewhat like a normal menstrual period with bleeding for 4 to 5 days, but this was 2 weeks earlier than her.  Was expected to come.  She has had some relatively irregular periods in recent months.  Says the pain has not gone away, not really gotten any better or worse, and it is quite painful but not crippling.  Says she is urinating normally.  Eating normally.  Has been nauseous at times, which is unusual, but no vomiting.  She denies any abnormal vaginal discharge, or any rash or vaginal itching.  Says she wants had a cyst on her ovary as far as she can recall, but this is not a frequent problem.  Otherwise she says she has been feeling well

## 2024-01-30 LAB — BACTERIA UR CULT: NORMAL

## 2024-01-30 NOTE — DISCHARGE INSTRUCTIONS
You will be notified by phone within 5 days if the results of your remaining tests are positive. If the results are negative, you will not be contacted. If you want to confirm the results of these tests anyway at least 2 days after your visit, register online for [facility] ([email or web address]) to view the results. No test results will be provided in person or by phone.    FOR MORE INFORMATION ABOUT STD’S, CONTACT:    [facility]  [address]  [address] [phone]

## 2024-02-01 LAB
C TRACH DNA CVX QL NAA+PROBE: NEGATIVE
N GONORRHOEA DNA CERV MUCUS QL NAA+PROBE: NEGATIVE

## 2024-03-07 ENCOUNTER — HOSPITAL ENCOUNTER (EMERGENCY)
Age: 24
Discharge: HOME | End: 2024-03-07
Attending: EMERGENCY MEDICINE
Payer: COMMERCIAL

## 2024-03-07 VITALS
HEART RATE: 78 BPM | TEMPERATURE: 100.4 F | SYSTOLIC BLOOD PRESSURE: 104 MMHG | BODY MASS INDEX: 24.02 KG/M2 | OXYGEN SATURATION: 98 % | WEIGHT: 130.51 LBS | HEIGHT: 62 IN | RESPIRATION RATE: 16 BRPM | DIASTOLIC BLOOD PRESSURE: 71 MMHG

## 2024-03-07 DIAGNOSIS — E86.0 DEHYDRATION: ICD-10-CM

## 2024-03-07 DIAGNOSIS — R11.2 NAUSEA AND VOMITING, UNSPECIFIED VOMITING TYPE: Primary | ICD-10-CM

## 2024-03-07 DIAGNOSIS — R51.9 ACUTE NONINTRACTABLE HEADACHE, UNSPECIFIED HEADACHE TYPE: ICD-10-CM

## 2024-03-07 DIAGNOSIS — R53.83 MALAISE AND FATIGUE: ICD-10-CM

## 2024-03-07 DIAGNOSIS — R53.81 MALAISE AND FATIGUE: ICD-10-CM

## 2024-03-07 LAB
ALBUMIN SERPL-MCNC: 4 G/DL (ref 3.4–5)
ALBUMIN/GLOB SERPL: 1 {RATIO} (ref 1.1–2.2)
ALP SERPL-CCNC: 83 U/L (ref 40–129)
ALT SERPL-CCNC: 13 U/L (ref 10–40)
ANION GAP SERPL CALCULATED.3IONS-SCNC: 13 MMOL/L (ref 3–16)
AST SERPL-CCNC: 17 U/L (ref 15–37)
BASOPHILS # BLD: 0 K/UL (ref 0–0.2)
BASOPHILS NFR BLD: 0 %
BILIRUB SERPL-MCNC: 0.5 MG/DL (ref 0–1)
BILIRUB UR QL STRIP.AUTO: ABNORMAL
BUN SERPL-MCNC: 15 MG/DL (ref 7–20)
CALCIUM SERPL-MCNC: 9.6 MG/DL (ref 8.3–10.6)
CHLORIDE SERPL-SCNC: 94 MMOL/L (ref 99–110)
CLARITY UR: ABNORMAL
CO2 SERPL-SCNC: 25 MMOL/L (ref 21–32)
COLOR UR: YELLOW
CREAT SERPL-MCNC: 0.8 MG/DL (ref 0.6–1.1)
DEPRECATED RDW RBC AUTO: 12.9 % (ref 12.4–15.4)
EOSINOPHIL # BLD: 0 K/UL (ref 0–0.6)
EOSINOPHIL NFR BLD: 0 %
EPI CELLS #/AREA URNS HPF: ABNORMAL /HPF (ref 0–5)
FLUAV RNA UPPER RESP QL NAA+PROBE: NEGATIVE
FLUBV AG NPH QL: NEGATIVE
GFR SERPLBLD CREATININE-BSD FMLA CKD-EPI: >60 ML/MIN/{1.73_M2}
GLUCOSE SERPL-MCNC: 102 MG/DL (ref 70–99)
GLUCOSE UR STRIP.AUTO-MCNC: NEGATIVE MG/DL
HCG SERPL QL: NEGATIVE
HCT VFR BLD AUTO: 42.1 % (ref 36–48)
HETEROPH AB BLD QL IA: NEGATIVE
HGB BLD-MCNC: 14.1 G/DL (ref 12–16)
HGB UR QL STRIP.AUTO: ABNORMAL
KETONES UR STRIP.AUTO-MCNC: 80 MG/DL
LEUKOCYTE ESTERASE UR QL STRIP.AUTO: NEGATIVE
LYMPHOCYTES # BLD: 1.8 K/UL (ref 1–5.1)
LYMPHOCYTES NFR BLD: 11 %
MCH RBC QN AUTO: 28.4 PG (ref 26–34)
MCHC RBC AUTO-ENTMCNC: 33.5 G/DL (ref 32–36.4)
MCV RBC AUTO: 84.7 FL (ref 80–100)
MONOCYTES # BLD: 1.3 K/UL (ref 0–1.3)
MONOCYTES NFR BLD: 8 %
NEUTROPHILS # BLD: 13.6 K/UL (ref 1.7–7.7)
NEUTROPHILS NFR BLD: 81 %
NITRITE UR QL STRIP.AUTO: NEGATIVE
PH UR STRIP.AUTO: 6 [PH] (ref 5–8)
PLATELET # BLD AUTO: 241 K/UL (ref 135–450)
PLATELET BLD QL SMEAR: ADEQUATE
PMV BLD AUTO: 8.5 FL (ref 5–10.5)
POTASSIUM SERPL-SCNC: 4.2 MMOL/L (ref 3.5–5.1)
PROT SERPL-MCNC: 7.9 G/DL (ref 6.4–8.2)
PROT UR STRIP.AUTO-MCNC: NEGATIVE MG/DL
RBC # BLD AUTO: 4.97 M/UL (ref 4–5.2)
RBC #/AREA URNS HPF: ABNORMAL /HPF (ref 0–4)
S PYO AG THROAT QL: NEGATIVE
SARS-COV-2 RDRP RESP QL NAA+PROBE: NOT DETECTED
SLIDE REVIEW: ABNORMAL
SODIUM SERPL-SCNC: 132 MMOL/L (ref 136–145)
SP GR UR STRIP.AUTO: 1.02 (ref 1–1.03)
UA COMPLETE W REFLEX CULTURE PNL UR: ABNORMAL
UA DIPSTICK W REFLEX MICRO PNL UR: YES
URN SPEC COLLECT METH UR: ABNORMAL
UROBILINOGEN UR STRIP-ACNC: 0.2 E.U./DL
WBC # BLD AUTO: 16.8 K/UL (ref 4–11)
WBC #/AREA URNS HPF: ABNORMAL /HPF (ref 0–5)

## 2024-03-07 PROCEDURE — 87081 CULTURE SCREEN ONLY: CPT

## 2024-03-07 PROCEDURE — 84703 CHORIONIC GONADOTROPIN ASSAY: CPT

## 2024-03-07 PROCEDURE — 36415 COLL VENOUS BLD VENIPUNCTURE: CPT

## 2024-03-07 PROCEDURE — 87635 SARS-COV-2 COVID-19 AMP PRB: CPT

## 2024-03-07 PROCEDURE — 85025 COMPLETE CBC W/AUTO DIFF WBC: CPT

## 2024-03-07 PROCEDURE — 86701 HIV-1ANTIBODY: CPT

## 2024-03-07 PROCEDURE — 80053 COMPREHEN METABOLIC PANEL: CPT

## 2024-03-07 PROCEDURE — 86308 HETEROPHILE ANTIBODY SCREEN: CPT

## 2024-03-07 PROCEDURE — 96374 THER/PROPH/DIAG INJ IV PUSH: CPT

## 2024-03-07 PROCEDURE — 2580000003 HC RX 258: Performed by: EMERGENCY MEDICINE

## 2024-03-07 PROCEDURE — 87804 INFLUENZA ASSAY W/OPTIC: CPT

## 2024-03-07 PROCEDURE — 6360000002 HC RX W HCPCS: Performed by: EMERGENCY MEDICINE

## 2024-03-07 PROCEDURE — 81001 URINALYSIS AUTO W/SCOPE: CPT

## 2024-03-07 PROCEDURE — 86702 HIV-2 ANTIBODY: CPT

## 2024-03-07 PROCEDURE — 96361 HYDRATE IV INFUSION ADD-ON: CPT

## 2024-03-07 PROCEDURE — 87880 STREP A ASSAY W/OPTIC: CPT

## 2024-03-07 PROCEDURE — 99284 EMERGENCY DEPT VISIT MOD MDM: CPT

## 2024-03-07 RX ORDER — ONDANSETRON 2 MG/ML
4 INJECTION INTRAMUSCULAR; INTRAVENOUS
Status: DISCONTINUED | OUTPATIENT
Start: 2024-03-07 | End: 2024-03-07 | Stop reason: HOSPADM

## 2024-03-07 RX ORDER — 0.9 % SODIUM CHLORIDE 0.9 %
1000 INTRAVENOUS SOLUTION INTRAVENOUS ONCE
Status: COMPLETED | OUTPATIENT
Start: 2024-03-07 | End: 2024-03-07

## 2024-03-07 RX ORDER — ONDANSETRON 4 MG/1
4 TABLET, ORALLY DISINTEGRATING ORAL 3 TIMES DAILY PRN
Qty: 21 TABLET | Refills: 0 | Status: SHIPPED | OUTPATIENT
Start: 2024-03-07

## 2024-03-07 RX ADMIN — SODIUM CHLORIDE 1000 ML: 9 INJECTION, SOLUTION INTRAVENOUS at 18:15

## 2024-03-07 RX ADMIN — ONDANSETRON 4 MG: 2 INJECTION INTRAMUSCULAR; INTRAVENOUS at 18:15

## 2024-03-07 ASSESSMENT — PAIN SCALES - GENERAL
PAINLEVEL_OUTOF10: 8
PAINLEVEL_OUTOF10: 2

## 2024-03-07 ASSESSMENT — PAIN - FUNCTIONAL ASSESSMENT: PAIN_FUNCTIONAL_ASSESSMENT: 0-10

## 2024-03-07 ASSESSMENT — PAIN DESCRIPTION - LOCATION: LOCATION: HEAD

## 2024-03-07 NOTE — ED PROVIDER NOTES
therefore we will hold off on any type of radiographic imaging of her chest.  She does have a mild leukocytosis, unclear if this was from the recent steroids or due to her acute illness which I feel at this point may be more likely to be viral.  COVID and flu both negative.  Labs otherwise unremarkable including no MAKEDA, no significant electrolyte abnormality.  She does have mild hyponatremia but was given IV fluids.  At the time of reassessment she does not have any current vomiting or nausea, and headache is resolved.  She does have ketones in her urine and we discussed continuation of oral hydration.  I did also obtain an pregnancy test which is noted to be negative.  She did have a partner sometime ago that her mom has urged her to get HIV testing, but not because this partner is known to be positive at this point for HIV.  After discussion of this testing, I did obtain HIV testing which is pending at time of discharge.  Patient to follow-up closely within Health system as well as with the health department.  At this time, I felt that is reasonable to discharge patient home with close outpatient follow-up.  Reasons to return to the emergency department were discussed at length and all questions answered at time of discharge          Labs reviewed and results discussed with patient. Patient was reassessed as noted above . Plan of care discussed with patient. Patient in agreement with plan. Strict return precautions have been given.      I, Dr. Alaina Zapata MD, am the primary clinician of record.        During the patient's ED course, the patient was given:  Medications   ondansetron (ZOFRAN) injection 4 mg (4 mg IntraVENous Given 3/7/24 1815)   sodium chloride 0.9 % bolus 1,000 mL (1,000 mLs IntraVENous New Bag 3/7/24 1815)        CLINICAL IMPRESSION  1. Nausea and vomiting, unspecified vomiting type    2. Dehydration    3. Acute nonintractable headache, unspecified headache type    4. Malaise and fatigue

## 2024-03-07 NOTE — DISCHARGE INSTRUCTIONS
After discussion, we have sent off a HIV test.  Please check [facility] for the results, and you will also be contacted if positive.  Please follow-up with the health department if this does have a positive result.

## 2024-03-07 NOTE — ED TRIAGE NOTES
Pt reports a 2 week history of diverse, flu-like symptoms: chills, headaches, nausea/vomiting/diarrhea, and a sore throat. She was seen a week ago at an urgent care where they gave her steroids and amoxicillin.

## 2024-03-11 LAB
HIV INTERPRETATION: NEGATIVE
HIV-1 ANTIBODY: NEGATIVE
HIV-2 AB: NEGATIVE

## 2024-06-03 ENCOUNTER — HOSPITAL ENCOUNTER (EMERGENCY)
Age: 24
Discharge: HOME | End: 2024-06-03
Payer: COMMERCIAL

## 2024-06-03 VITALS
SYSTOLIC BLOOD PRESSURE: 123 MMHG | HEART RATE: 90 BPM | DIASTOLIC BLOOD PRESSURE: 80 MMHG | BODY MASS INDEX: 26.45 KG/M2 | RESPIRATION RATE: 16 BRPM | WEIGHT: 143.74 LBS | HEIGHT: 62 IN | TEMPERATURE: 98 F | OXYGEN SATURATION: 99 %

## 2024-06-03 DIAGNOSIS — Z78.9 BODY PIERCING: ICD-10-CM

## 2024-06-03 DIAGNOSIS — L08.9 LOCAL INFECTION OF SKIN AND SUBCUTANEOUS TISSUE: Primary | ICD-10-CM

## 2024-06-03 PROCEDURE — 6370000000 HC RX 637 (ALT 250 FOR IP): Performed by: PHYSICIAN ASSISTANT

## 2024-06-03 PROCEDURE — 99283 EMERGENCY DEPT VISIT LOW MDM: CPT

## 2024-06-03 RX ORDER — BACITRACIN ZINC AND POLYMYXIN B SULFATE 500; 1000 [USP'U]/G; [USP'U]/G
OINTMENT TOPICAL
Qty: 15 G | Refills: 1 | Status: SHIPPED | OUTPATIENT
Start: 2024-06-03 | End: 2024-06-10

## 2024-06-03 RX ORDER — IBUPROFEN 200 MG
TABLET ORAL ONCE
Status: COMPLETED | OUTPATIENT
Start: 2024-06-03 | End: 2024-06-03

## 2024-06-03 RX ADMIN — BACITRACIN ZINC, NEOMYCIN SULFATE, AND POLYMYXIN B SULFATE: 400; 3.5; 5 OINTMENT TOPICAL at 18:36

## 2024-06-03 ASSESSMENT — PAIN DESCRIPTION - PAIN TYPE: TYPE: ACUTE PAIN

## 2024-06-03 ASSESSMENT — PAIN - FUNCTIONAL ASSESSMENT
PAIN_FUNCTIONAL_ASSESSMENT: ACTIVITIES ARE NOT PREVENTED
PAIN_FUNCTIONAL_ASSESSMENT: 0-10

## 2024-06-03 ASSESSMENT — PAIN SCALES - GENERAL: PAINLEVEL_OUTOF10: 1

## 2024-06-03 ASSESSMENT — LIFESTYLE VARIABLES
HOW MANY STANDARD DRINKS CONTAINING ALCOHOL DO YOU HAVE ON A TYPICAL DAY: 1 OR 2
HOW OFTEN DO YOU HAVE A DRINK CONTAINING ALCOHOL: 2-4 TIMES A MONTH

## 2024-06-03 ASSESSMENT — PAIN DESCRIPTION - DESCRIPTORS: DESCRIPTORS: DISCOMFORT

## 2024-06-03 ASSESSMENT — PAIN DESCRIPTION - FREQUENCY: FREQUENCY: INTERMITTENT

## 2024-06-03 ASSESSMENT — PAIN DESCRIPTION - LOCATION: LOCATION: STERNUM

## 2024-06-03 ASSESSMENT — PAIN DESCRIPTION - ORIENTATION: ORIENTATION: MID

## 2024-06-03 NOTE — ED PROVIDER NOTES
made to edit the dictations but occasionally words are mis-transcribed.)    ALICE Desir (electronically signed)        Dustin Arias PA  06/03/24 1926

## 2024-06-03 NOTE — ED TRIAGE NOTES
Patient arrived to the ED ambulatory  from home w/ complaints of foreign body in skin .     Patient/EMS reports states Has a sternal piecing that was place x2 years ago; over last month the piecing has been pushing itself out of her chest wall; reports that it had been draining x2 weeks and then over the past two days there is now pain at the site and that is radiating around the site; states she just needs the piercing removed because she can't get it w/ her nails and maybe a ABX for the drainage      Patient A&O x 4, VSS ,

## 2024-07-31 ENCOUNTER — HOSPITAL ENCOUNTER (EMERGENCY)
Age: 24
Discharge: HOME | End: 2024-07-31
Attending: EMERGENCY MEDICINE
Payer: COMMERCIAL

## 2024-07-31 VITALS
DIASTOLIC BLOOD PRESSURE: 74 MMHG | BODY MASS INDEX: 25.48 KG/M2 | HEART RATE: 107 BPM | WEIGHT: 138.45 LBS | TEMPERATURE: 98.5 F | HEIGHT: 62 IN | SYSTOLIC BLOOD PRESSURE: 105 MMHG | RESPIRATION RATE: 18 BRPM | OXYGEN SATURATION: 95 %

## 2024-07-31 DIAGNOSIS — J06.9 VIRAL UPPER RESPIRATORY TRACT INFECTION: Primary | ICD-10-CM

## 2024-07-31 LAB
S PYO AG THROAT QL: NEGATIVE
SARS-COV-2 RDRP RESP QL NAA+PROBE: NOT DETECTED

## 2024-07-31 PROCEDURE — 87635 SARS-COV-2 COVID-19 AMP PRB: CPT

## 2024-07-31 PROCEDURE — 99283 EMERGENCY DEPT VISIT LOW MDM: CPT

## 2024-07-31 PROCEDURE — 87880 STREP A ASSAY W/OPTIC: CPT

## 2024-07-31 PROCEDURE — 87081 CULTURE SCREEN ONLY: CPT

## 2024-07-31 PROCEDURE — 6370000000 HC RX 637 (ALT 250 FOR IP): Performed by: EMERGENCY MEDICINE

## 2024-07-31 RX ORDER — BENZONATATE 200 MG/1
200 CAPSULE ORAL 3 TIMES DAILY PRN
Qty: 30 CAPSULE | Refills: 0 | Status: SHIPPED | OUTPATIENT
Start: 2024-07-31 | End: 2024-08-07

## 2024-07-31 RX ORDER — IBUPROFEN 400 MG/1
400 TABLET ORAL ONCE
Status: COMPLETED | OUTPATIENT
Start: 2024-07-31 | End: 2024-07-31

## 2024-07-31 RX ORDER — GUAIFENESIN AND DEXTROMETHORPHAN HYDROBROMIDE 600; 30 MG/1; MG/1
1 TABLET, EXTENDED RELEASE ORAL 2 TIMES DAILY
Qty: 14 TABLET | Refills: 0 | Status: SHIPPED | OUTPATIENT
Start: 2024-07-31

## 2024-07-31 RX ADMIN — IBUPROFEN 400 MG: 400 TABLET, FILM COATED ORAL at 16:47

## 2024-07-31 ASSESSMENT — PAIN SCALES - GENERAL
PAINLEVEL_OUTOF10: 4
PAINLEVEL_OUTOF10: 5

## 2024-07-31 ASSESSMENT — PAIN DESCRIPTION - LOCATION
LOCATION: GENERALIZED
LOCATION: HEAD;GENERALIZED

## 2024-07-31 NOTE — ED TRIAGE NOTES
Patient presents to ED for c/o fever x2 days. Patent states her throat has been red and swollen. Also c/o sinus pressure. Patient states she feels hot, states she took 600mg of ibuprofen prior to arrival. Patient denies N/V/D.

## 2024-07-31 NOTE — ED PROVIDER NOTES
ibuprofen (ADVIL;MOTRIN) 600 MG tablet Take 1 tablet by mouth 4 times daily as needed for Pain 40 tablet 0     Allergies   Allergen Reactions    Latex        REVIEW OF SYSTEMS  Positives and pertinent negatives as per HPI.  Six other systems were reviewed and are negative.  Nursing notes pertaining to ROS were reviewed.          PHYSICAL EXAM   /74   Pulse (!) 107   Temp 98.5 °F (36.9 °C) (Tympanic)   Resp 18   Ht 1.575 m (5' 2\")   Wt 62.8 kg (138 lb 7.2 oz)   LMP 07/17/2024   SpO2 95%   BMI 25.32 kg/m²   GENERAL APPEARANCE: Awake and alert. Cooperative. No acute distress.  No increased work of breathing or accessory muscle use.  HEAD: Normocephalic. Atraumatic.  EYES: PERRL. EOM's grossly intact. No scleral icterus, injection or exudate.  ENT: Mucous membranes are moist.  TMs are clear bilaterally.  Oral cavity is clear without tonsillar hypertrophy or exudate.  No palatal petechiae. No frontal or maxillary sinus tenderness to palpation.  Nasal MM are clear.   NECK: Supple. Normal ROM.  No cervical lymphadenopathy.  CHEST:  Regular rate and rhythm, no murmurs, rubs or gallops.  LUNGS: Breathing is unlabored. Speaking comfortably in full sentences. Clear through auscultation bilaterally  ABDOMEN: Nondistended, nontender.  EXTREMITIES: MAEE. No acute deformities.   SKIN: Warm and dry.  No rash  NEUROLOGICAL: Alert and oriented.       LABS  I have reviewed all labs for this visit.   Results for orders placed or performed during the hospital encounter of 07/31/24   Strep Screen Group A Throat    Specimen: Throat   Result Value Ref Range    Rapid Strep A Screen Negative Negative   COVID-19, Rapid    Specimen: Nasopharyngeal Swab   Result Value Ref Range    SARS-CoV-2, NAAT Not Detected Not Detected       ED COURSE/MDM  Acute upper respiratory infection without hypoxia or increased work of breathing and otherwise well-appearing patient with no evidence of focal bacterial illness or sepsis.  COVID-19 and

## 2024-07-31 NOTE — ED NOTES
Provider order placed for patient's discharge. Provider reviewed decision to discharge with the patient. Discharge paperwork and any prescriptions were reviewed with the patient. Patient verbalized understanding of discharge education and any prescriptions and has no further questions or further needs at this time. Patient left with all personal belongings and was stable upon departure. Patient thanked for choosing [facility] and informed to return should any need arise.

## 2024-08-03 LAB — S PYO THROAT QL CULT: NORMAL

## 2024-11-18 ENCOUNTER — HOSPITAL ENCOUNTER (EMERGENCY)
Age: 24
Discharge: HOME | End: 2024-11-18
Payer: COMMERCIAL

## 2024-11-18 VITALS
HEART RATE: 87 BPM | TEMPERATURE: 98.2 F | OXYGEN SATURATION: 97 % | HEIGHT: 62 IN | BODY MASS INDEX: 25.56 KG/M2 | DIASTOLIC BLOOD PRESSURE: 61 MMHG | RESPIRATION RATE: 15 BRPM | SYSTOLIC BLOOD PRESSURE: 94 MMHG | WEIGHT: 138.89 LBS

## 2024-11-18 DIAGNOSIS — T50.905A ADVERSE DRUG EFFECT, INITIAL ENCOUNTER: Primary | ICD-10-CM

## 2024-11-18 LAB
ALBUMIN SERPL-MCNC: 4.4 G/DL (ref 3.4–5)
ALBUMIN/GLOB SERPL: 1.5 {RATIO} (ref 1.1–2.2)
ALP SERPL-CCNC: 85 U/L (ref 40–129)
ALT SERPL-CCNC: 18 U/L (ref 10–40)
AMPHETAMINES UR QL SCN>1000 NG/ML: ABNORMAL
ANION GAP SERPL CALCULATED.3IONS-SCNC: 12 MMOL/L (ref 3–16)
AST SERPL-CCNC: 30 U/L (ref 15–37)
BARBITURATES UR QL SCN>200 NG/ML: ABNORMAL
BASOPHILS # BLD: 0 K/UL (ref 0–0.2)
BASOPHILS NFR BLD: 0.4 %
BENZODIAZ UR QL SCN>200 NG/ML: ABNORMAL
BILIRUB SERPL-MCNC: <0.2 MG/DL (ref 0–1)
BILIRUB UR QL STRIP.AUTO: NEGATIVE
BUN SERPL-MCNC: 12 MG/DL (ref 7–20)
CALCIUM SERPL-MCNC: 9.4 MG/DL (ref 8.3–10.6)
CANNABINOIDS UR QL SCN>50 NG/ML: POSITIVE
CHLORIDE SERPL-SCNC: 103 MMOL/L (ref 99–110)
CLARITY UR: ABNORMAL
CO2 SERPL-SCNC: 25 MMOL/L (ref 21–32)
COCAINE UR QL SCN: ABNORMAL
COLOR UR: YELLOW
CREAT SERPL-MCNC: 0.7 MG/DL (ref 0.6–1.1)
DEPRECATED RDW RBC AUTO: 13.4 % (ref 12.4–15.4)
DRUG SCREEN COMMENT UR-IMP: ABNORMAL
EKG ATRIAL RATE: 135 BPM
EKG DIAGNOSIS: NORMAL
EKG P AXIS: 54 DEGREES
EKG P-R INTERVAL: 136 MS
EKG Q-T INTERVAL: 290 MS
EKG QRS DURATION: 82 MS
EKG QTC CALCULATION (BAZETT): 435 MS
EKG R AXIS: 43 DEGREES
EKG T AXIS: -3 DEGREES
EKG VENTRICULAR RATE: 135 BPM
EOSINOPHIL # BLD: 0 K/UL (ref 0–0.6)
EOSINOPHIL NFR BLD: 0.5 %
EPI CELLS #/AREA URNS HPF: NORMAL /HPF (ref 0–5)
FENTANYL SCREEN, URINE: ABNORMAL
GFR SERPLBLD CREATININE-BSD FMLA CKD-EPI: >90 ML/MIN/{1.73_M2}
GLUCOSE SERPL-MCNC: 121 MG/DL (ref 70–99)
GLUCOSE UR STRIP.AUTO-MCNC: NEGATIVE MG/DL
HCG SERPL QL: NEGATIVE
HCT VFR BLD AUTO: 37.5 % (ref 36–48)
HGB BLD-MCNC: 12.9 G/DL (ref 12–16)
HGB UR QL STRIP.AUTO: NEGATIVE
KETONES UR STRIP.AUTO-MCNC: NEGATIVE MG/DL
LEUKOCYTE ESTERASE UR QL STRIP.AUTO: ABNORMAL
LYMPHOCYTES # BLD: 3.4 K/UL (ref 1–5.1)
LYMPHOCYTES NFR BLD: 44.3 %
MAGNESIUM SERPL-MCNC: 1.95 MG/DL (ref 1.8–2.4)
MCH RBC QN AUTO: 30.3 PG (ref 26–34)
MCHC RBC AUTO-ENTMCNC: 34.3 G/DL (ref 31–36)
MCV RBC AUTO: 88.4 FL (ref 80–100)
METHADONE UR QL SCN>300 NG/ML: ABNORMAL
MONOCYTES # BLD: 1.1 K/UL (ref 0–1.3)
MONOCYTES NFR BLD: 13.9 %
NEUTROPHILS # BLD: 3.2 K/UL (ref 1.7–7.7)
NEUTROPHILS NFR BLD: 40.9 %
NITRITE UR QL STRIP.AUTO: NEGATIVE
OPIATES UR QL SCN>300 NG/ML: ABNORMAL
OXYCODONE UR QL SCN: ABNORMAL
PCP UR QL SCN>25 NG/ML: ABNORMAL
PH UR STRIP.AUTO: 6 [PH] (ref 5–8)
PH UR STRIP: 6 [PH]
PLATELET # BLD AUTO: 274 K/UL (ref 135–450)
PMV BLD AUTO: 7 FL (ref 5–10.5)
POTASSIUM SERPL-SCNC: 3.4 MMOL/L (ref 3.5–5.1)
PROT SERPL-MCNC: 7.4 G/DL (ref 6.4–8.2)
PROT UR STRIP.AUTO-MCNC: NEGATIVE MG/DL
RBC # BLD AUTO: 4.24 M/UL (ref 4–5.2)
RBC #/AREA URNS HPF: NORMAL /HPF (ref 0–4)
SODIUM SERPL-SCNC: 140 MMOL/L (ref 136–145)
SP GR UR STRIP.AUTO: 1.01 (ref 1–1.03)
TROPONIN, HIGH SENSITIVITY: <6 NG/L (ref 0–14)
UA COMPLETE W REFLEX CULTURE PNL UR: ABNORMAL
UA DIPSTICK W REFLEX MICRO PNL UR: YES
URN SPEC COLLECT METH UR: ABNORMAL
UROBILINOGEN UR STRIP-ACNC: 0.2 E.U./DL
WBC # BLD AUTO: 7.7 K/UL (ref 4–11)
WBC #/AREA URNS HPF: NORMAL /HPF (ref 0–5)

## 2024-11-18 PROCEDURE — 93010 ELECTROCARDIOGRAM REPORT: CPT | Performed by: INTERNAL MEDICINE

## 2024-11-18 PROCEDURE — 6360000002 HC RX W HCPCS: Performed by: PHYSICIAN ASSISTANT

## 2024-11-18 PROCEDURE — 99284 EMERGENCY DEPT VISIT MOD MDM: CPT

## 2024-11-18 PROCEDURE — 80307 DRUG TEST PRSMV CHEM ANLYZR: CPT

## 2024-11-18 PROCEDURE — 96375 TX/PRO/DX INJ NEW DRUG ADDON: CPT

## 2024-11-18 PROCEDURE — 80053 COMPREHEN METABOLIC PANEL: CPT

## 2024-11-18 PROCEDURE — 96374 THER/PROPH/DIAG INJ IV PUSH: CPT

## 2024-11-18 PROCEDURE — 81001 URINALYSIS AUTO W/SCOPE: CPT

## 2024-11-18 PROCEDURE — 85025 COMPLETE CBC W/AUTO DIFF WBC: CPT

## 2024-11-18 PROCEDURE — 84703 CHORIONIC GONADOTROPIN ASSAY: CPT

## 2024-11-18 PROCEDURE — 84484 ASSAY OF TROPONIN QUANT: CPT

## 2024-11-18 PROCEDURE — 83735 ASSAY OF MAGNESIUM: CPT

## 2024-11-18 PROCEDURE — 93005 ELECTROCARDIOGRAM TRACING: CPT | Performed by: EMERGENCY MEDICINE

## 2024-11-18 RX ORDER — LORAZEPAM 2 MG/ML
0.5 INJECTION INTRAMUSCULAR ONCE
Status: COMPLETED | OUTPATIENT
Start: 2024-11-18 | End: 2024-11-18

## 2024-11-18 RX ORDER — ONDANSETRON 2 MG/ML
4 INJECTION INTRAMUSCULAR; INTRAVENOUS ONCE
Status: COMPLETED | OUTPATIENT
Start: 2024-11-18 | End: 2024-11-18

## 2024-11-18 RX ADMIN — LORAZEPAM 0.5 MG: 2 INJECTION INTRAMUSCULAR; INTRAVENOUS at 09:37

## 2024-11-18 RX ADMIN — ONDANSETRON 4 MG: 2 INJECTION INTRAMUSCULAR; INTRAVENOUS at 09:37

## 2024-11-18 ASSESSMENT — LIFESTYLE VARIABLES
HOW MANY STANDARD DRINKS CONTAINING ALCOHOL DO YOU HAVE ON A TYPICAL DAY: 3 OR 4
HOW OFTEN DO YOU HAVE A DRINK CONTAINING ALCOHOL: MONTHLY OR LESS

## 2024-11-18 ASSESSMENT — PAIN - FUNCTIONAL ASSESSMENT
PAIN_FUNCTIONAL_ASSESSMENT: NONE - DENIES PAIN
PAIN_FUNCTIONAL_ASSESSMENT: NONE - DENIES PAIN

## 2024-11-18 NOTE — ED NOTES
Pt to ED from home via [facility] c/o tachycardia after eating an \"gummy that I got from a friend\" Pt . Pt is alert and orient to person, place,time and situation. VSS, afebrile.

## 2024-11-18 NOTE — ED PROVIDER NOTES
DEXTROMETHORPHAN-GUAIFENESIN (MUCINEX DM)  MG TB12    Take 1 tablet by mouth 2 times daily    IBUPROFEN (ADVIL;MOTRIN) 600 MG TABLET    Take 1 tablet by mouth 4 times daily as needed for Pain       ALLERGIES     Latex    FAMILYHISTORY     History reviewed. No pertinent family history.     SOCIAL HISTORY       Social History     Tobacco Use    Smoking status: Never    Smokeless tobacco: Never   Vaping Use    Vaping status: Never Used   Substance Use Topics    Alcohol use: Not Currently     Comment: weekend    Drug use: No       SCREENINGS    Wilian Coma Scale  Eye Opening: Spontaneous  Best Verbal Response: Oriented  Best Motor Response: Obeys commands  Wilian Coma Scale Score: 15      PHYSICAL EXAM    (up to 7 for level 4, 8 or more for level 5)     ED Triage Vitals [11/18/24 0911]   BP Systolic BP Percentile Diastolic BP Percentile Temp Temp Source Pulse Respirations SpO2   (!) 112/93 -- -- 98.2 °F (36.8 °C) Oral (!) 124 19 99 %      Height Weight - Scale         1.575 m (5' 2\") 63 kg (138 lb 14.2 oz)             Physical Exam  Vitals and nursing note reviewed.   Constitutional:       General: She is not in acute distress.     Appearance: Normal appearance. She is not ill-appearing.   HENT:      Head: Normocephalic and atraumatic.      Nose: Nose normal.   Eyes:      General:         Right eye: No discharge.         Left eye: No discharge.   Cardiovascular:      Rate and Rhythm: Regular rhythm. Tachycardia present.      Heart sounds: Normal heart sounds.   Pulmonary:      Effort: Pulmonary effort is normal. No respiratory distress.      Breath sounds: Normal breath sounds. No stridor. No wheezing, rhonchi or rales.   Abdominal:      General: Bowel sounds are normal. There is no distension.      Palpations: Abdomen is soft. There is no mass.      Tenderness: There is no abdominal tenderness. There is no guarding or rebound.   Musculoskeletal:         General: Normal range of motion.      Cervical back:

## 2025-04-12 ENCOUNTER — HOSPITAL ENCOUNTER (EMERGENCY)
Age: 25
Discharge: HOME | End: 2025-04-12
Payer: COMMERCIAL

## 2025-04-12 ENCOUNTER — APPOINTMENT (OUTPATIENT)
Dept: GENERAL RADIOLOGY | Age: 25
End: 2025-04-12
Payer: COMMERCIAL

## 2025-04-12 VITALS
HEIGHT: 62 IN | DIASTOLIC BLOOD PRESSURE: 68 MMHG | OXYGEN SATURATION: 99 % | TEMPERATURE: 100.2 F | WEIGHT: 147.27 LBS | BODY MASS INDEX: 27.1 KG/M2 | SYSTOLIC BLOOD PRESSURE: 83 MMHG | HEART RATE: 116 BPM | RESPIRATION RATE: 21 BRPM

## 2025-04-12 DIAGNOSIS — J10.1 INFLUENZA A: Primary | ICD-10-CM

## 2025-04-12 LAB
ANION GAP SERPL CALCULATED.3IONS-SCNC: 11 MMOL/L (ref 3–16)
BACTERIA URNS QL MICRO: ABNORMAL /HPF
BASOPHILS # BLD: 0 K/UL (ref 0–0.2)
BASOPHILS NFR BLD: 0.2 %
BILIRUB UR QL STRIP.AUTO: NEGATIVE
BUN SERPL-MCNC: 10 MG/DL (ref 7–20)
CALCIUM SERPL-MCNC: 9 MG/DL (ref 8.3–10.6)
CHLORIDE SERPL-SCNC: 102 MMOL/L (ref 99–110)
CLARITY UR: CLEAR
CO2 SERPL-SCNC: 21 MMOL/L (ref 21–32)
COLOR UR: YELLOW
CREAT SERPL-MCNC: 0.7 MG/DL (ref 0.6–1.1)
D-DIMER QUANTITATIVE: <0.27 UG/ML FEU (ref 0–0.6)
DEPRECATED RDW RBC AUTO: 13 % (ref 12.4–15.4)
EOSINOPHIL # BLD: 0 K/UL (ref 0–0.6)
EOSINOPHIL NFR BLD: 0.1 %
EPI CELLS #/AREA URNS AUTO: 8 /HPF (ref 0–5)
FLUAV + FLUBV AG NOSE IA.RAPID: DETECTED
FLUAV + FLUBV AG NOSE IA.RAPID: NOT DETECTED
GFR SERPLBLD CREATININE-BSD FMLA CKD-EPI: >90 ML/MIN/{1.73_M2}
GLUCOSE SERPL-MCNC: 137 MG/DL (ref 70–99)
GLUCOSE UR STRIP.AUTO-MCNC: NEGATIVE MG/DL
HCG SERPL QL: NEGATIVE
HCT VFR BLD AUTO: 41.2 % (ref 36–48)
HGB BLD-MCNC: 14.1 G/DL (ref 12–16)
HGB UR QL STRIP.AUTO: NEGATIVE
HYALINE CASTS #/AREA URNS AUTO: 0 /LPF (ref 0–8)
KETONES UR STRIP.AUTO-MCNC: ABNORMAL MG/DL
LACTATE BLDV-SCNC: 0.8 MMOL/L (ref 0.4–1.9)
LEUKOCYTE ESTERASE UR QL STRIP.AUTO: ABNORMAL
LYMPHOCYTES # BLD: 0.4 K/UL (ref 1–5.1)
LYMPHOCYTES NFR BLD: 4.9 %
MCH RBC QN AUTO: 30.3 PG (ref 26–34)
MCHC RBC AUTO-ENTMCNC: 34.3 G/DL (ref 31–36)
MCV RBC AUTO: 88.3 FL (ref 80–100)
MONOCYTES # BLD: 0.7 K/UL (ref 0–1.3)
MONOCYTES NFR BLD: 7.6 %
NEUTROPHILS # BLD: 7.7 K/UL (ref 1.7–7.7)
NEUTROPHILS NFR BLD: 87.2 %
NITRITE UR QL STRIP.AUTO: NEGATIVE
PH UR STRIP.AUTO: 5.5 [PH] (ref 5–8)
PLATELET # BLD AUTO: 198 K/UL (ref 135–450)
PMV BLD AUTO: 7.9 FL (ref 5–10.5)
POTASSIUM SERPL-SCNC: 3.9 MMOL/L (ref 3.5–5.1)
PROT UR STRIP.AUTO-MCNC: 30 MG/DL
RBC # BLD AUTO: 4.66 M/UL (ref 4–5.2)
RBC CLUMPS #/AREA URNS AUTO: 5 /HPF (ref 0–4)
SARS-COV-2 RDRP RESP QL NAA+PROBE: NOT DETECTED
SODIUM SERPL-SCNC: 134 MMOL/L (ref 136–145)
SP GR UR STRIP.AUTO: 1.04 (ref 1–1.03)
UA COMPLETE W REFLEX CULTURE PNL UR: YES
UA DIPSTICK W REFLEX MICRO PNL UR: YES
URN SPEC COLLECT METH UR: ABNORMAL
UROBILINOGEN UR STRIP-ACNC: 1 E.U./DL
WBC # BLD AUTO: 8.9 K/UL (ref 4–11)
WBC #/AREA URNS AUTO: 17 /HPF (ref 0–5)

## 2025-04-12 PROCEDURE — 81001 URINALYSIS AUTO W/SCOPE: CPT

## 2025-04-12 PROCEDURE — 85379 FIBRIN DEGRADATION QUANT: CPT

## 2025-04-12 PROCEDURE — 80048 BASIC METABOLIC PNL TOTAL CA: CPT

## 2025-04-12 PROCEDURE — 36415 COLL VENOUS BLD VENIPUNCTURE: CPT

## 2025-04-12 PROCEDURE — 6370000000 HC RX 637 (ALT 250 FOR IP): Performed by: PHYSICIAN ASSISTANT

## 2025-04-12 PROCEDURE — 71046 X-RAY EXAM CHEST 2 VIEWS: CPT

## 2025-04-12 PROCEDURE — 84703 CHORIONIC GONADOTROPIN ASSAY: CPT

## 2025-04-12 PROCEDURE — 99284 EMERGENCY DEPT VISIT MOD MDM: CPT

## 2025-04-12 PROCEDURE — 2580000003 HC RX 258: Performed by: PHYSICIAN ASSISTANT

## 2025-04-12 PROCEDURE — 87086 URINE CULTURE/COLONY COUNT: CPT

## 2025-04-12 PROCEDURE — 85025 COMPLETE CBC W/AUTO DIFF WBC: CPT

## 2025-04-12 PROCEDURE — 87502 INFLUENZA DNA AMP PROBE: CPT

## 2025-04-12 PROCEDURE — 87635 SARS-COV-2 COVID-19 AMP PRB: CPT

## 2025-04-12 PROCEDURE — 83605 ASSAY OF LACTIC ACID: CPT

## 2025-04-12 RX ORDER — 0.9 % SODIUM CHLORIDE 0.9 %
1000 INTRAVENOUS SOLUTION INTRAVENOUS ONCE
Status: COMPLETED | OUTPATIENT
Start: 2025-04-12 | End: 2025-04-12

## 2025-04-12 RX ORDER — OSELTAMIVIR PHOSPHATE 75 MG/1
75 CAPSULE ORAL 2 TIMES DAILY
Qty: 10 CAPSULE | Refills: 0 | Status: SHIPPED | OUTPATIENT
Start: 2025-04-12 | End: 2025-04-17

## 2025-04-12 RX ORDER — ACETAMINOPHEN 500 MG
1000 TABLET ORAL ONCE
Status: COMPLETED | OUTPATIENT
Start: 2025-04-12 | End: 2025-04-12

## 2025-04-12 RX ADMIN — SODIUM CHLORIDE 1000 ML: 0.9 INJECTION, SOLUTION INTRAVENOUS at 12:19

## 2025-04-12 RX ADMIN — ACETAMINOPHEN 1000 MG: 500 TABLET ORAL at 12:15

## 2025-04-12 ASSESSMENT — PAIN DESCRIPTION - DESCRIPTORS
DESCRIPTORS: ACHING
DESCRIPTORS: ACHING

## 2025-04-12 ASSESSMENT — LIFESTYLE VARIABLES
HOW OFTEN DO YOU HAVE A DRINK CONTAINING ALCOHOL: MONTHLY OR LESS
HOW MANY STANDARD DRINKS CONTAINING ALCOHOL DO YOU HAVE ON A TYPICAL DAY: 3 OR 4

## 2025-04-12 ASSESSMENT — PAIN - FUNCTIONAL ASSESSMENT
PAIN_FUNCTIONAL_ASSESSMENT: ACTIVITIES ARE NOT PREVENTED
PAIN_FUNCTIONAL_ASSESSMENT: ACTIVITIES ARE NOT PREVENTED

## 2025-04-12 ASSESSMENT — PAIN DESCRIPTION - LOCATION
LOCATION: GENERALIZED
LOCATION: GENERALIZED

## 2025-04-12 ASSESSMENT — PAIN SCALES - GENERAL
PAINLEVEL_OUTOF10: 4
PAINLEVEL_OUTOF10: 4
PAINLEVEL_OUTOF10: 6

## 2025-04-12 ASSESSMENT — PAIN DESCRIPTION - FREQUENCY
FREQUENCY: CONTINUOUS
FREQUENCY: CONTINUOUS

## 2025-04-12 ASSESSMENT — PAIN DESCRIPTION - PAIN TYPE
TYPE: ACUTE PAIN
TYPE: ACUTE PAIN

## 2025-04-12 ASSESSMENT — PAIN DESCRIPTION - ONSET: ONSET: ON-GOING

## 2025-04-12 NOTE — ED NOTES
D/C: Order noted for d/c. Pt confirmed d/c paperwork has correct name. Discharge and education instructions reviewed with patient. Teach-back successful.  Pt verbalized understanding and denied questions at this time. No acute distress noted. Patient instructed to follow-up as noted - return to emergency department if symptoms worsen. Patient verbalized understanding. Discharged per EDMD with discharge instructions. Pt discharged to private vehicle. Patient stable upon departure. Thanked patient for [facility] for care. Provider aware of patient pain at time of discharge.

## 2025-04-12 NOTE — ED PROVIDER NOTES
[facility] EMERGENCY DEPARTMENT  EMERGENCY DEPARTMENT ENCOUNTER      Pt Name: Dorota Sheppard  MRN: [id number]  Birthdate 2000  Date of evaluation: 4/12/2025  Provider: ALICE Desir  PCP: Nina Murray MD  Note Started: 11:58 AM EDT     The ED Attending Physician was available for consultation but did not see or evaluate this patient.    CHIEF COMPLAINT       Chief Complaint   Patient presents with    Generalized Body Aches     Pt into ED from home c/o 4/10 generalized body aches, and productive cough with yellow sputum since Thursday. Tachy, and febrile on arrival       HISTORY OF PRESENT ILLNESS   (Location, Timing/Onset, Context/Setting, Quality, Duration, Modifying Factors, Severity, Associated Signs and Symptoms)  Note limiting factors.     Dorota Sheppard is a 24 y.o. female who presents with complaint of cough, fatigue, cold symptoms, fever.  She says that 2 days ago she was in a swimming pool trying to help her child who is in distress and she ended up inhaling a lot of water.  Says it went into her lungs, not her stomach.  Since then she has been coughing a lot, but she has also had a poor appetite, has had some nasal congestion and sneezing, and feels somewhat feverish.  No vomiting.  No known recent sick contacts.  No chronic lung disease or any other chronic medical problems, no known immune deficiency or immunosuppression.  Urinating normally.  Last menstrual period was a few weeks ago, does not think she is pregnant.    Nursing Notes were all reviewed and agreed with or any disagreements were addressed in the HPI.    REVIEW OF SYSTEMS    (2-9 systems for level 4, 10 or more for level 5)     Positives and pertinent negatives as per HPI.     PAST MEDICAL HISTORY   No past medical history on file.    SURGICAL HISTORY   No past surgical history on file.    CURRENTMEDICATIONS       Previous Medications    DEXTROMETHORPHAN-GUAIFENESIN (MUCINEX DM)  MG TB12    Take 1    Vitals:    04/12/25 1145 04/12/25 1230 04/12/25 1245 04/12/25 1300   BP: 128/78 117/76 111/74 99/64   Pulse: (!) 118 (!) 116 (!) 114 (!) 115   Resp: 18 20 21 29   Temp: 100.2 °F (37.9 °C)      TempSrc: Oral      SpO2: 96% 100% 100% 100%   Weight: 66.8 kg (147 lb 4.3 oz)      Height: 1.575 m (5' 2\")          Patient was given the following medications:  Medications   sodium chloride 0.9 % bolus 1,000 mL (1,000 mLs IntraVENous New Bag 4/12/25 1219)   acetaminophen (TYLENOL) tablet 1,000 mg (1,000 mg Oral Given 4/12/25 1215)           Is this patient to be included in the SEP-1 Core Measure due to severe sepsis or septic shock?   No     Exclusion criteria - the patient is NOT to be included for SEP-1 Core Measure due to:  Viral etiology found or highly suspected (including COVID-19) without concomitant bacterial infection    Patient tachycardic, with temperature 100.2 °F.  She was given a liter of IV fluid in the ED.  Also given Tylenol.  Chest x-ray showed no acute findings.  Viral testing positive for influenza A, COVID-19 test negative.  Lab workup otherwise was reassuring, with no leukocytosis, negative D-dimer, normal serum lactate.  Not pregnant.  Patient oxygenated well throughout her stay in the ED, showed no signs of respiratory distress.  There is no indication for hospitalization or further workup.  Patient be discharged with prescription for Tamiflu and advised to follow-up with primary care if symptoms do not improve within a few days.  The patient verbalized understanding and agreement with this plan of care. The patient was advised to return to the emergency department if symptoms should significantly worsen or if new and concerning symptoms should appear.     I estimate there is LOW risk for PNEUMONIA, ARDS, SIGNIFICANT HYPOXIA, MENINGITIS, PERITONSILLAR ABSCESS, SEPSIS, MALIGNANT OTITIS EXTERNA, OR EPIGLOTTITIS thus I consider the discharge disposition reasonable.     CRITICAL CARE TIME

## 2025-04-12 NOTE — ED NOTES
Pt arrived to room, assisted to the restroom, urine collected, pt placed in pt gown, call light provided, bed in lowest position, swabbed for covid and flu, medications provided, labs sent. No needs voiced at this time. ED tech provided pt with warm blanket.

## 2025-04-12 NOTE — ED TRIAGE NOTES
Pt into ED from home c/o 4/10 generalized body aches, and productive cough with yellow sputum since Thursday. Tachy, and febrile on arrival. Pt states she went swimming in a hotel pool on Thursday evening, and ingested water. Pt states she was having chest pain at that time, but sx went away. Has taken tylenol at home for pain relief. Denies N/V/D

## 2025-04-13 ENCOUNTER — RESULTS FOLLOW-UP (OUTPATIENT)
Dept: EMERGENCY DEPT | Age: 25
End: 2025-04-13

## 2025-04-13 LAB — BACTERIA UR CULT: NORMAL

## 2025-08-19 ENCOUNTER — HOSPITAL ENCOUNTER (EMERGENCY)
Age: 25
Discharge: HOME | End: 2025-08-19
Attending: EMERGENCY MEDICINE
Payer: COMMERCIAL

## 2025-08-19 VITALS
HEIGHT: 61 IN | WEIGHT: 140 LBS | OXYGEN SATURATION: 99 % | SYSTOLIC BLOOD PRESSURE: 115 MMHG | RESPIRATION RATE: 14 BRPM | DIASTOLIC BLOOD PRESSURE: 87 MMHG | TEMPERATURE: 98.6 F | BODY MASS INDEX: 26.43 KG/M2 | HEART RATE: 81 BPM

## 2025-08-19 DIAGNOSIS — A60.00 GENITAL HERPES SIMPLEX, UNSPECIFIED SITE: Primary | ICD-10-CM

## 2025-08-19 DIAGNOSIS — B37.31 VAGINAL CANDIDA: ICD-10-CM

## 2025-08-19 LAB
BACTERIA GENITAL QL WET PREP: ABNORMAL
BACTERIA URNS QL MICRO: ABNORMAL /HPF
BILIRUB UR QL STRIP.AUTO: ABNORMAL
CLARITY UR: CLEAR
CLUE CELLS SPEC QL WET PREP: ABNORMAL
COLOR UR: YELLOW
EPI CELLS #/AREA URNS HPF: ABNORMAL /HPF (ref 0–5)
EPI CELLS SPEC QL WET PREP: ABNORMAL
GLUCOSE UR STRIP.AUTO-MCNC: NEGATIVE MG/DL
HCG UR QL: NEGATIVE
HGB UR QL STRIP.AUTO: ABNORMAL
KETONES UR STRIP.AUTO-MCNC: ABNORMAL MG/DL
LEUKOCYTE ESTERASE UR QL STRIP.AUTO: ABNORMAL
MUCOUS THREADS #/AREA URNS LPF: ABNORMAL /LPF
NITRITE UR QL STRIP.AUTO: NEGATIVE
PH UR STRIP.AUTO: 6 [PH] (ref 5–8)
PROT UR STRIP.AUTO-MCNC: ABNORMAL MG/DL
RBC #/AREA URNS HPF: ABNORMAL /HPF (ref 0–4)
RBC SPEC QL WET PREP: ABNORMAL
SP GR UR STRIP.AUTO: 1.02 (ref 1–1.03)
SPECIMEN SOURCE FLD: ABNORMAL
T VAGINALIS GENITAL QL WET PREP: ABNORMAL
UA COMPLETE W REFLEX CULTURE PNL UR: YES
UA DIPSTICK W REFLEX MICRO PNL UR: YES
URN SPEC COLLECT METH UR: ABNORMAL
UROBILINOGEN UR STRIP-ACNC: 1 E.U./DL
WBC #/AREA URNS HPF: ABNORMAL /HPF (ref 0–5)
WBC SPEC QL WET PREP: ABNORMAL
YEAST GENITAL QL WET PREP: ABNORMAL
YEAST URNS QL MICRO: PRESENT /HPF

## 2025-08-19 PROCEDURE — 81001 URINALYSIS AUTO W/SCOPE: CPT

## 2025-08-19 PROCEDURE — 87591 N.GONORRHOEAE DNA AMP PROB: CPT

## 2025-08-19 PROCEDURE — 87086 URINE CULTURE/COLONY COUNT: CPT

## 2025-08-19 PROCEDURE — 87210 SMEAR WET MOUNT SALINE/INK: CPT

## 2025-08-19 PROCEDURE — 84703 CHORIONIC GONADOTROPIN ASSAY: CPT

## 2025-08-19 PROCEDURE — 87491 CHLMYD TRACH DNA AMP PROBE: CPT

## 2025-08-19 PROCEDURE — 99283 EMERGENCY DEPT VISIT LOW MDM: CPT

## 2025-08-19 RX ORDER — FLUCONAZOLE 150 MG/1
150 TABLET ORAL
Qty: 2 TABLET | Refills: 0 | Status: SHIPPED | OUTPATIENT
Start: 2025-08-19 | End: 2025-08-25

## 2025-08-19 RX ORDER — VALACYCLOVIR HYDROCHLORIDE 1 G/1
1000 TABLET, FILM COATED ORAL 2 TIMES DAILY
Qty: 14 TABLET | Refills: 0 | Status: SHIPPED | OUTPATIENT
Start: 2025-08-19 | End: 2025-08-26

## 2025-08-19 ASSESSMENT — PAIN - FUNCTIONAL ASSESSMENT: PAIN_FUNCTIONAL_ASSESSMENT: 0-10

## 2025-08-19 ASSESSMENT — PAIN DESCRIPTION - LOCATION: LOCATION: VAGINA

## 2025-08-19 ASSESSMENT — PAIN SCALES - GENERAL: PAINLEVEL_OUTOF10: 5

## 2025-08-19 ASSESSMENT — PAIN DESCRIPTION - PAIN TYPE: TYPE: ACUTE PAIN

## 2025-08-19 ASSESSMENT — PAIN DESCRIPTION - DESCRIPTORS: DESCRIPTORS: BURNING;DISCOMFORT

## 2025-08-20 LAB
BACTERIA UR CULT: NORMAL
C TRACH DNA CVX QL NAA+PROBE: NEGATIVE
N GONORRHOEA DNA CERV MUCUS QL NAA+PROBE: NEGATIVE